# Patient Record
Sex: FEMALE | Race: WHITE | HISPANIC OR LATINO | Employment: FULL TIME | URBAN - METROPOLITAN AREA
[De-identification: names, ages, dates, MRNs, and addresses within clinical notes are randomized per-mention and may not be internally consistent; named-entity substitution may affect disease eponyms.]

---

## 2017-02-23 ENCOUNTER — APPOINTMENT (EMERGENCY)
Dept: RADIOLOGY | Facility: HOSPITAL | Age: 27
End: 2017-02-23
Payer: COMMERCIAL

## 2017-02-23 ENCOUNTER — HOSPITAL ENCOUNTER (EMERGENCY)
Facility: HOSPITAL | Age: 27
Discharge: HOME | End: 2017-02-24
Attending: EMERGENCY MEDICINE | Admitting: EMERGENCY MEDICINE
Payer: COMMERCIAL

## 2017-02-23 VITALS
HEART RATE: 84 BPM | DIASTOLIC BLOOD PRESSURE: 68 MMHG | RESPIRATION RATE: 18 BRPM | OXYGEN SATURATION: 97 % | SYSTOLIC BLOOD PRESSURE: 116 MMHG | TEMPERATURE: 97.9 F | WEIGHT: 126 LBS

## 2017-02-23 DIAGNOSIS — O21.9 NAUSEA/VOMITING IN PREGNANCY: Primary | ICD-10-CM

## 2017-02-23 LAB
ABO GROUP BLD: NORMAL
ALBUMIN SERPL BCP-MCNC: 4.1 G/DL (ref 3.5–5)
ALP SERPL-CCNC: 74 U/L (ref 46–116)
ALT SERPL W P-5'-P-CCNC: 16 U/L (ref 12–78)
ANION GAP SERPL CALCULATED.3IONS-SCNC: 8 MMOL/L (ref 4–13)
AST SERPL W P-5'-P-CCNC: 10 U/L (ref 5–45)
B-HCG SERPL-ACNC: 2337 MIU/ML
BACTERIA UR QL AUTO: NORMAL /HPF
BASOPHILS # BLD AUTO: 0.03 THOUSANDS/ΜL (ref 0–0.1)
BASOPHILS NFR BLD AUTO: 0 % (ref 0–1)
BILIRUB SERPL-MCNC: 0.24 MG/DL (ref 0.2–1)
BILIRUB UR QL STRIP: NEGATIVE
BLD GP AB SCN SERPL QL: NEGATIVE
BUN SERPL-MCNC: 9 MG/DL (ref 5–25)
CALCIUM SERPL-MCNC: 9.1 MG/DL (ref 8.3–10.1)
CHLORIDE SERPL-SCNC: 103 MMOL/L (ref 100–108)
CLARITY UR: CLEAR
CO2 SERPL-SCNC: 27 MMOL/L (ref 21–32)
COLOR UR: YELLOW
COLOR, POC: YELLOW
CREAT SERPL-MCNC: 0.62 MG/DL (ref 0.6–1.3)
EOSINOPHIL # BLD AUTO: 0.05 THOUSAND/ΜL (ref 0–0.61)
EOSINOPHIL NFR BLD AUTO: 1 % (ref 0–6)
ERYTHROCYTE [DISTWIDTH] IN BLOOD BY AUTOMATED COUNT: 12.5 % (ref 11.6–15.1)
GFR SERPL CREATININE-BSD FRML MDRD: >60 ML/MIN/1.73SQ M
GLUCOSE SERPL-MCNC: 88 MG/DL (ref 65–140)
GLUCOSE UR STRIP-MCNC: NEGATIVE MG/DL
HCG UR QL: POSITIVE
HCT VFR BLD AUTO: 37.2 % (ref 34.8–46.1)
HGB BLD-MCNC: 12.4 G/DL (ref 11.5–15.4)
HGB UR QL STRIP.AUTO: NEGATIVE
HYALINE CASTS #/AREA URNS LPF: NORMAL /LPF
KETONES UR STRIP-MCNC: NEGATIVE MG/DL
LEUKOCYTE ESTERASE UR QL STRIP: NEGATIVE
LIPASE SERPL-CCNC: 167 U/L (ref 73–393)
LYMPHOCYTES # BLD AUTO: 2.68 THOUSANDS/ΜL (ref 0.6–4.47)
LYMPHOCYTES NFR BLD AUTO: 28 % (ref 14–44)
MCH RBC QN AUTO: 30.5 PG (ref 26.8–34.3)
MCHC RBC AUTO-ENTMCNC: 33.3 G/DL (ref 31.4–37.4)
MCV RBC AUTO: 92 FL (ref 82–98)
MONOCYTES # BLD AUTO: 0.6 THOUSAND/ΜL (ref 0.17–1.22)
MONOCYTES NFR BLD AUTO: 6 % (ref 4–12)
NEUTROPHILS # BLD AUTO: 6.25 THOUSANDS/ΜL (ref 1.85–7.62)
NEUTS SEG NFR BLD AUTO: 65 % (ref 43–75)
NITRITE UR QL STRIP: NEGATIVE
NON-SQ EPI CELLS URNS QL MICRO: NORMAL /HPF
NRBC BLD AUTO-RTO: 0 /100 WBCS
PH UR STRIP.AUTO: 8.5 [PH] (ref 4.5–8)
PLATELET # BLD AUTO: 285 THOUSANDS/UL (ref 149–390)
PMV BLD AUTO: 11.8 FL (ref 8.9–12.7)
POTASSIUM SERPL-SCNC: 3.6 MMOL/L (ref 3.5–5.3)
PROT SERPL-MCNC: 8.2 G/DL (ref 6.4–8.2)
PROT UR STRIP-MCNC: ABNORMAL MG/DL
RBC # BLD AUTO: 4.06 MILLION/UL (ref 3.81–5.12)
RBC #/AREA URNS AUTO: NORMAL /HPF
RH BLD: POSITIVE
SODIUM SERPL-SCNC: 138 MMOL/L (ref 136–145)
SP GR UR STRIP.AUTO: 1.01 (ref 1–1.03)
UROBILINOGEN UR QL STRIP.AUTO: 1 E.U./DL
WBC # BLD AUTO: 9.62 THOUSAND/UL (ref 4.31–10.16)
WBC #/AREA URNS AUTO: NORMAL /HPF

## 2017-02-23 PROCEDURE — 81002 URINALYSIS NONAUTO W/O SCOPE: CPT

## 2017-02-23 PROCEDURE — 81025 URINE PREGNANCY TEST: CPT

## 2017-02-23 PROCEDURE — 86850 RBC ANTIBODY SCREEN: CPT | Performed by: EMERGENCY MEDICINE

## 2017-02-23 PROCEDURE — 36415 COLL VENOUS BLD VENIPUNCTURE: CPT

## 2017-02-23 PROCEDURE — 86900 BLOOD TYPING SEROLOGIC ABO: CPT | Performed by: EMERGENCY MEDICINE

## 2017-02-23 PROCEDURE — 96361 HYDRATE IV INFUSION ADD-ON: CPT

## 2017-02-23 PROCEDURE — 84702 CHORIONIC GONADOTROPIN TEST: CPT | Performed by: EMERGENCY MEDICINE

## 2017-02-23 PROCEDURE — 76801 OB US < 14 WKS SINGLE FETUS: CPT

## 2017-02-23 PROCEDURE — 85025 COMPLETE CBC W/AUTO DIFF WBC: CPT

## 2017-02-23 PROCEDURE — 83690 ASSAY OF LIPASE: CPT

## 2017-02-23 PROCEDURE — 86901 BLOOD TYPING SEROLOGIC RH(D): CPT | Performed by: EMERGENCY MEDICINE

## 2017-02-23 PROCEDURE — 76705 ECHO EXAM OF ABDOMEN: CPT

## 2017-02-23 PROCEDURE — 80053 COMPREHEN METABOLIC PANEL: CPT

## 2017-02-23 PROCEDURE — 96374 THER/PROPH/DIAG INJ IV PUSH: CPT

## 2017-02-23 PROCEDURE — 81001 URINALYSIS AUTO W/SCOPE: CPT

## 2017-02-23 RX ORDER — DOXYLAMINE SUCCINATE AND PYRIDOXINE HYDROCHLORIDE, DELAYED RELEASE TABLETS 10 MG/10 MG 10; 10 MG/1; MG/1
10 TABLET, DELAYED RELEASE ORAL
Qty: 30 TABLET | Refills: 0 | Status: SHIPPED | OUTPATIENT
Start: 2017-02-23 | End: 2017-05-01

## 2017-02-23 RX ORDER — ONDANSETRON 2 MG/ML
4 INJECTION INTRAMUSCULAR; INTRAVENOUS ONCE AS NEEDED
Status: COMPLETED | OUTPATIENT
Start: 2017-02-23 | End: 2017-02-23

## 2017-02-23 RX ADMIN — ONDANSETRON 4 MG: 2 INJECTION INTRAMUSCULAR; INTRAVENOUS at 21:56

## 2017-02-23 RX ADMIN — SODIUM CHLORIDE 500 ML: 0.9 INJECTION, SOLUTION INTRAVENOUS at 21:54

## 2017-02-24 PROCEDURE — 99284 EMERGENCY DEPT VISIT MOD MDM: CPT

## 2017-03-29 ENCOUNTER — HOSPITAL ENCOUNTER (EMERGENCY)
Facility: HOSPITAL | Age: 27
Discharge: HOME | End: 2017-03-29
Attending: EMERGENCY MEDICINE | Admitting: EMERGENCY MEDICINE
Payer: COMMERCIAL

## 2017-03-29 VITALS
RESPIRATION RATE: 18 BRPM | OXYGEN SATURATION: 98 % | TEMPERATURE: 96.6 F | HEART RATE: 87 BPM | SYSTOLIC BLOOD PRESSURE: 123 MMHG | DIASTOLIC BLOOD PRESSURE: 68 MMHG | WEIGHT: 117 LBS

## 2017-03-29 DIAGNOSIS — R33.9 URINARY RETENTION: Primary | ICD-10-CM

## 2017-03-29 LAB
BACTERIA UR QL AUTO: NORMAL /HPF
BILIRUB UR QL STRIP: NEGATIVE
CLARITY UR: CLEAR
COLOR UR: YELLOW
COLOR, POC: YELLOW
GLUCOSE UR STRIP-MCNC: NEGATIVE MG/DL
HCG UR QL: ABNORMAL
HGB UR QL STRIP.AUTO: ABNORMAL
KETONES UR STRIP-MCNC: NEGATIVE MG/DL
LEUKOCYTE ESTERASE UR QL STRIP: NEGATIVE
NITRITE UR QL STRIP: NEGATIVE
NON-SQ EPI CELLS URNS QL MICRO: NORMAL /HPF
PH UR STRIP.AUTO: 7 [PH] (ref 4.5–8)
PROT UR STRIP-MCNC: NEGATIVE MG/DL
RBC #/AREA URNS AUTO: NORMAL /HPF
SP GR UR STRIP.AUTO: 1.01 (ref 1–1.03)
UROBILINOGEN UR QL STRIP.AUTO: 0.2 E.U./DL
WBC #/AREA URNS AUTO: NORMAL /HPF

## 2017-03-29 PROCEDURE — 81025 URINE PREGNANCY TEST: CPT | Performed by: EMERGENCY MEDICINE

## 2017-03-29 PROCEDURE — 87086 URINE CULTURE/COLONY COUNT: CPT

## 2017-03-29 PROCEDURE — 99283 EMERGENCY DEPT VISIT LOW MDM: CPT

## 2017-03-29 PROCEDURE — 81002 URINALYSIS NONAUTO W/O SCOPE: CPT | Performed by: EMERGENCY MEDICINE

## 2017-03-29 PROCEDURE — 81001 URINALYSIS AUTO W/SCOPE: CPT

## 2017-03-30 ENCOUNTER — ALLSCRIPTS OFFICE VISIT (OUTPATIENT)
Dept: OTHER | Facility: OTHER | Age: 27
End: 2017-03-30

## 2017-03-31 LAB — BACTERIA UR CULT: NORMAL

## 2017-04-13 ENCOUNTER — GENERIC CONVERSION - ENCOUNTER (OUTPATIENT)
Dept: OTHER | Facility: OTHER | Age: 27
End: 2017-04-13

## 2017-04-13 ENCOUNTER — ALLSCRIPTS OFFICE VISIT (OUTPATIENT)
Dept: OTHER | Facility: OTHER | Age: 27
End: 2017-04-13

## 2017-04-13 DIAGNOSIS — Z34.00 ENCOUNTER FOR SUPERVISION OF NORMAL FIRST PREGNANCY: ICD-10-CM

## 2017-04-14 ENCOUNTER — TRANSCRIBE ORDERS (OUTPATIENT)
Dept: ADMINISTRATIVE | Age: 27
End: 2017-04-14

## 2017-04-14 ENCOUNTER — APPOINTMENT (OUTPATIENT)
Dept: LAB | Age: 27
End: 2017-04-14
Payer: COMMERCIAL

## 2017-04-14 DIAGNOSIS — Z34.00 ENCOUNTER FOR SUPERVISION OF NORMAL FIRST PREGNANCY: ICD-10-CM

## 2017-04-14 LAB
BACTERIA UR QL AUTO: ABNORMAL /HPF
BASOPHILS # BLD AUTO: 0.02 THOUSANDS/ΜL (ref 0–0.1)
BASOPHILS NFR BLD AUTO: 0 % (ref 0–1)
BILIRUB UR QL STRIP: NEGATIVE
CLARITY UR: CLEAR
COLOR UR: YELLOW
EOSINOPHIL # BLD AUTO: 0.08 THOUSAND/ΜL (ref 0–0.61)
EOSINOPHIL NFR BLD AUTO: 1 % (ref 0–6)
ERYTHROCYTE [DISTWIDTH] IN BLOOD BY AUTOMATED COUNT: 12.4 % (ref 11.6–15.1)
GLUCOSE UR STRIP-MCNC: NEGATIVE MG/DL
HCT VFR BLD AUTO: 35.1 % (ref 34.8–46.1)
HGB BLD-MCNC: 11.4 G/DL (ref 11.5–15.4)
HGB UR QL STRIP.AUTO: NEGATIVE
HYALINE CASTS #/AREA URNS LPF: ABNORMAL /LPF
KETONES UR STRIP-MCNC: NEGATIVE MG/DL
LEUKOCYTE ESTERASE UR QL STRIP: NEGATIVE
LYMPHOCYTES # BLD AUTO: 2.04 THOUSANDS/ΜL (ref 0.6–4.47)
LYMPHOCYTES NFR BLD AUTO: 22 % (ref 14–44)
MCH RBC QN AUTO: 29.7 PG (ref 26.8–34.3)
MCHC RBC AUTO-ENTMCNC: 32.5 G/DL (ref 31.4–37.4)
MCV RBC AUTO: 91 FL (ref 82–98)
MONOCYTES # BLD AUTO: 0.41 THOUSAND/ΜL (ref 0.17–1.22)
MONOCYTES NFR BLD AUTO: 4 % (ref 4–12)
NEUTROPHILS # BLD AUTO: 6.67 THOUSANDS/ΜL (ref 1.85–7.62)
NEUTS SEG NFR BLD AUTO: 73 % (ref 43–75)
NITRITE UR QL STRIP: NEGATIVE
NON-SQ EPI CELLS URNS QL MICRO: ABNORMAL /HPF
NRBC BLD AUTO-RTO: 0 /100 WBCS
PH UR STRIP.AUTO: 7.5 [PH] (ref 4.5–8)
PLATELET # BLD AUTO: 260 THOUSANDS/UL (ref 149–390)
PMV BLD AUTO: 12.1 FL (ref 8.9–12.7)
PROT UR STRIP-MCNC: ABNORMAL MG/DL
RBC # BLD AUTO: 3.84 MILLION/UL (ref 3.81–5.12)
RBC #/AREA URNS AUTO: ABNORMAL /HPF
SP GR UR STRIP.AUTO: 1.03 (ref 1–1.03)
UROBILINOGEN UR QL STRIP.AUTO: 0.2 E.U./DL
WBC # BLD AUTO: 9.24 THOUSAND/UL (ref 4.31–10.16)
WBC #/AREA URNS AUTO: ABNORMAL /HPF

## 2017-04-14 PROCEDURE — 87086 URINE CULTURE/COLONY COUNT: CPT

## 2017-04-14 PROCEDURE — 81001 URINALYSIS AUTO W/SCOPE: CPT

## 2017-04-14 PROCEDURE — 36415 COLL VENOUS BLD VENIPUNCTURE: CPT

## 2017-04-14 PROCEDURE — 87186 SC STD MICRODIL/AGAR DIL: CPT

## 2017-04-14 PROCEDURE — 80081 OBSTETRIC PANEL INC HIV TSTG: CPT

## 2017-04-14 PROCEDURE — 87081 CULTURE SCREEN ONLY: CPT

## 2017-04-14 PROCEDURE — 87147 CULTURE TYPE IMMUNOLOGIC: CPT

## 2017-04-15 LAB
ABO GROUP BLD: NORMAL
BLD GP AB SCN SERPL QL: NEGATIVE
HBV SURFACE AG SER QL: NORMAL
RH BLD: POSITIVE
RUBV IGG SERPL IA-ACNC: 26.9 IU/ML

## 2017-04-16 LAB
BACTERIA UR CULT: NORMAL
MRSA NOSE QL CULT: NORMAL

## 2017-04-17 LAB
HIV 1+2 AB+HIV1 P24 AG SERPL QL IA: NORMAL
RPR SER QL: NORMAL

## 2017-04-20 ENCOUNTER — ALLSCRIPTS OFFICE VISIT (OUTPATIENT)
Dept: PERINATAL CARE | Facility: CLINIC | Age: 27
End: 2017-04-20
Payer: COMMERCIAL

## 2017-04-20 ENCOUNTER — GENERIC CONVERSION - ENCOUNTER (OUTPATIENT)
Dept: OTHER | Facility: OTHER | Age: 27
End: 2017-04-20

## 2017-04-20 PROCEDURE — 76813 OB US NUCHAL MEAS 1 GEST: CPT | Performed by: OBSTETRICS & GYNECOLOGY

## 2017-04-25 ENCOUNTER — LAB REQUISITION (OUTPATIENT)
Dept: LAB | Facility: HOSPITAL | Age: 27
End: 2017-04-25
Payer: COMMERCIAL

## 2017-04-25 ENCOUNTER — ALLSCRIPTS OFFICE VISIT (OUTPATIENT)
Dept: OTHER | Facility: OTHER | Age: 27
End: 2017-04-25

## 2017-04-25 DIAGNOSIS — Z12.4 ENCOUNTER FOR SCREENING FOR MALIGNANT NEOPLASM OF CERVIX: ICD-10-CM

## 2017-04-25 DIAGNOSIS — Z34.00 ENCOUNTER FOR SUPERVISION OF NORMAL FIRST PREGNANCY: ICD-10-CM

## 2017-04-25 LAB
BILIRUB UR QL STRIP: NEGATIVE
CLARITY UR: NORMAL
COLOR UR: NORMAL
GLUCOSE (HISTORICAL): NEGATIVE
HGB UR QL STRIP.AUTO: NEGATIVE
KETONES UR STRIP-MCNC: NEGATIVE MG/DL
LEUKOCYTE ESTERASE UR QL STRIP: NEGATIVE
NITRITE UR QL STRIP: NEGATIVE
PH UR STRIP.AUTO: 5 [PH]
PROT UR STRIP-MCNC: NORMAL MG/DL
SP GR UR STRIP.AUTO: 1.02
UROBILINOGEN UR QL STRIP.AUTO: 0.2

## 2017-04-25 PROCEDURE — G0145 SCR C/V CYTO,THINLAYER,RESCR: HCPCS | Performed by: NURSE PRACTITIONER

## 2017-04-26 ENCOUNTER — LAB CONVERSION - ENCOUNTER (OUTPATIENT)
Dept: OTHER | Facility: OTHER | Age: 27
End: 2017-04-26

## 2017-04-26 LAB
AGE RISK DOWN SYNDROME (HISTORICAL): NORMAL
CALC'D GESTATIONAL AGE (HISTORICAL): 13.1
COLLECTION DATE (HISTORICAL): NORMAL
CROWN RUMP LENGTH (HISTORICAL): 70 MM
CROWN RUMP LENGTH (HISTORICAL): NORMAL MM
DATE OF BIRTH (HISTORICAL): NORMAL
DONOR AGE; EGG RETRIEVAL (HISTORICAL): NORMAL
DONOR EGG (HISTORICAL): NO
EDD DETERMINED BY (HISTORICAL): NORMAL
ESTIMATED DELIVERY DATE (EDD) (HISTORICAL): NORMAL
HCG MOM (HISTORICAL): 0.97
HCG QUANTITATIVE (HISTORICAL): 77.8 IU/ML
HX OF NEURAL TUBE DEFECTS (HISTORICAL): NO
IF TWINS (HISTORICAL): NORMAL
INSULIN DEP. DIABETIC (HISTORICAL): NO
INTERPRETATION (HISTORICAL): NORMAL
MATERNAL WEIGHT (HISTORICAL): 123 LBS
MSS DOWN SYNDROME RISK (HISTORICAL): NORMAL
MSS3 TRISOMY 18 RISK (HISTORICAL): NORMAL
NASAL BONE (HISTORICAL): NORMAL
NASAL BONE (HISTORICAL): PRESENT
NT MOM (HISTORICAL): 1.22
NTQR LOCATION ID (HISTORICAL): NORMAL
NTQR READING PHYS ID (HISTORICAL): NORMAL
NUCHAL TRANSLUCENCY (HISTORICAL): 1.9 MM
NUCHAL TRANSLUCENCY (HISTORICAL): NORMAL MM
NUMBER OF FETUSES (HISTORICAL): 1
PAPP-A (HISTORICAL): 0.49
PAPP-A (HISTORICAL): 714.8 NG/ML
PREV PREGNANCY DOWN SYND (HISTORICAL): NO
RACE/ETHNIC ORIGIN (HISTORICAL): NORMAL
REFERRING PHYSICIAN (HISTORICAL): NORMAL
REFERRING PHYSICIAN NPI (HISTORICAL): NORMAL
REFERRING PHYSICIAN PHONE (HISTORICAL): NORMAL
REPEAT SPECIMEN (HISTORICAL): NO
ULTRASONOGRAPHER ID (HISTORICAL): NORMAL
ULTRASOUND DATE (HISTORICAL): NORMAL

## 2017-04-26 PROCEDURE — 87491 CHLMYD TRACH DNA AMP PROBE: CPT | Performed by: NURSE PRACTITIONER

## 2017-04-26 PROCEDURE — 87591 N.GONORRHOEAE DNA AMP PROB: CPT | Performed by: NURSE PRACTITIONER

## 2017-04-28 ENCOUNTER — GENERIC CONVERSION - ENCOUNTER (OUTPATIENT)
Dept: OTHER | Facility: OTHER | Age: 27
End: 2017-04-28

## 2017-04-28 LAB
CHLAMYDIA DNA CVX QL NAA+PROBE: NORMAL
N GONORRHOEA DNA GENITAL QL NAA+PROBE: NORMAL

## 2017-05-01 ENCOUNTER — HOSPITAL ENCOUNTER (EMERGENCY)
Facility: HOSPITAL | Age: 27
Discharge: HOME | End: 2017-05-01
Attending: EMERGENCY MEDICINE | Admitting: EMERGENCY MEDICINE
Payer: COMMERCIAL

## 2017-05-01 ENCOUNTER — GENERIC CONVERSION - ENCOUNTER (OUTPATIENT)
Dept: OTHER | Facility: OTHER | Age: 27
End: 2017-05-01

## 2017-05-01 VITALS
DIASTOLIC BLOOD PRESSURE: 55 MMHG | HEIGHT: 61 IN | OXYGEN SATURATION: 100 % | TEMPERATURE: 97.5 F | RESPIRATION RATE: 18 BRPM | HEART RATE: 102 BPM | SYSTOLIC BLOOD PRESSURE: 102 MMHG

## 2017-05-01 DIAGNOSIS — R10.9 ABDOMINAL PAIN DURING PREGNANCY IN SECOND TRIMESTER: Primary | ICD-10-CM

## 2017-05-01 DIAGNOSIS — O26.892 ABDOMINAL PAIN DURING PREGNANCY IN SECOND TRIMESTER: Primary | ICD-10-CM

## 2017-05-01 LAB
ALBUMIN SERPL BCP-MCNC: 3.3 G/DL (ref 3.5–5)
ALP SERPL-CCNC: 53 U/L (ref 46–116)
ALT SERPL W P-5'-P-CCNC: 30 U/L (ref 12–78)
ANION GAP SERPL CALCULATED.3IONS-SCNC: 9 MMOL/L (ref 4–13)
AST SERPL W P-5'-P-CCNC: 19 U/L (ref 5–45)
ATRIAL RATE: 112 BPM
ATRIAL RATE: 96 BPM
BACTERIA UR QL AUTO: ABNORMAL /HPF
BASOPHILS # BLD AUTO: 0.02 THOUSANDS/ΜL (ref 0–0.1)
BASOPHILS NFR BLD AUTO: 0 % (ref 0–1)
BILIRUB SERPL-MCNC: 0.28 MG/DL (ref 0.2–1)
BILIRUB UR QL STRIP: ABNORMAL
BUN SERPL-MCNC: 4 MG/DL (ref 5–25)
CALCIUM SERPL-MCNC: 9.2 MG/DL (ref 8.3–10.1)
CHLORIDE SERPL-SCNC: 104 MMOL/L (ref 100–108)
CLARITY UR: ABNORMAL
CO2 SERPL-SCNC: 25 MMOL/L (ref 21–32)
COLOR UR: YELLOW
COLOR, POC: YELLOW
CREAT SERPL-MCNC: 0.4 MG/DL (ref 0.6–1.3)
EOSINOPHIL # BLD AUTO: 0.04 THOUSAND/ΜL (ref 0–0.61)
EOSINOPHIL NFR BLD AUTO: 1 % (ref 0–6)
ERYTHROCYTE [DISTWIDTH] IN BLOOD BY AUTOMATED COUNT: 12.3 % (ref 11.6–15.1)
GFR SERPL CREATININE-BSD FRML MDRD: >60 ML/MIN/1.73SQ M
GLUCOSE SERPL-MCNC: 79 MG/DL (ref 65–140)
GLUCOSE UR STRIP-MCNC: NEGATIVE MG/DL
HCT VFR BLD AUTO: 32.1 % (ref 34.8–46.1)
HGB BLD-MCNC: 10.6 G/DL (ref 11.5–15.4)
HGB UR QL STRIP.AUTO: NEGATIVE
HYALINE CASTS #/AREA URNS LPF: ABNORMAL /LPF
KETONES UR STRIP-MCNC: ABNORMAL MG/DL
LEUKOCYTE ESTERASE UR QL STRIP: NEGATIVE
LIPASE SERPL-CCNC: 108 U/L (ref 73–393)
LYMPHOCYTES # BLD AUTO: 2.35 THOUSANDS/ΜL (ref 0.6–4.47)
LYMPHOCYTES NFR BLD AUTO: 30 % (ref 14–44)
MCH RBC QN AUTO: 29.8 PG (ref 26.8–34.3)
MCHC RBC AUTO-ENTMCNC: 33 G/DL (ref 31.4–37.4)
MCV RBC AUTO: 90 FL (ref 82–98)
MONOCYTES # BLD AUTO: 0.35 THOUSAND/ΜL (ref 0.17–1.22)
MONOCYTES NFR BLD AUTO: 5 % (ref 4–12)
NEUTROPHILS # BLD AUTO: 5.04 THOUSANDS/ΜL (ref 1.85–7.62)
NEUTS SEG NFR BLD AUTO: 64 % (ref 43–75)
NITRITE UR QL STRIP: NEGATIVE
NON-SQ EPI CELLS URNS QL MICRO: ABNORMAL /HPF
NRBC BLD AUTO-RTO: 0 /100 WBCS
P AXIS: 82 DEGREES
P AXIS: 93 DEGREES
PH UR STRIP.AUTO: 5.5 [PH] (ref 4.5–8)
PLATELET # BLD AUTO: 234 THOUSANDS/UL (ref 149–390)
PMV BLD AUTO: 11.1 FL (ref 8.9–12.7)
POTASSIUM SERPL-SCNC: 3.4 MMOL/L (ref 3.5–5.3)
PR INTERVAL: 118 MS
PR INTERVAL: 122 MS
PROT SERPL-MCNC: 7.5 G/DL (ref 6.4–8.2)
PROT UR STRIP-MCNC: ABNORMAL MG/DL
QRS AXIS: 74 DEGREES
QRS AXIS: 80 DEGREES
QRSD INTERVAL: 82 MS
QRSD INTERVAL: 92 MS
QT INTERVAL: 296 MS
QT INTERVAL: 372 MS
QTC INTERVAL: 404 MS
QTC INTERVAL: 469 MS
RBC # BLD AUTO: 3.56 MILLION/UL (ref 3.81–5.12)
RBC #/AREA URNS AUTO: ABNORMAL /HPF
SODIUM SERPL-SCNC: 138 MMOL/L (ref 136–145)
SP GR UR STRIP.AUTO: >=1.03 (ref 1–1.03)
SPECIMEN SOURCE: NORMAL
T WAVE AXIS: 69 DEGREES
T WAVE AXIS: 79 DEGREES
TROPONIN I BLD-MCNC: 0 NG/ML (ref 0–0.08)
UROBILINOGEN UR QL STRIP.AUTO: 0.2 E.U./DL
VENTRICULAR RATE: 112 BPM
VENTRICULAR RATE: 96 BPM
WBC # BLD AUTO: 7.82 THOUSAND/UL (ref 4.31–10.16)
WBC #/AREA URNS AUTO: ABNORMAL /HPF

## 2017-05-01 PROCEDURE — 96361 HYDRATE IV INFUSION ADD-ON: CPT

## 2017-05-01 PROCEDURE — 93005 ELECTROCARDIOGRAM TRACING: CPT

## 2017-05-01 PROCEDURE — 80053 COMPREHEN METABOLIC PANEL: CPT | Performed by: EMERGENCY MEDICINE

## 2017-05-01 PROCEDURE — 36415 COLL VENOUS BLD VENIPUNCTURE: CPT | Performed by: EMERGENCY MEDICINE

## 2017-05-01 PROCEDURE — 85025 COMPLETE CBC W/AUTO DIFF WBC: CPT | Performed by: EMERGENCY MEDICINE

## 2017-05-01 PROCEDURE — 99284 EMERGENCY DEPT VISIT MOD MDM: CPT

## 2017-05-01 PROCEDURE — 93005 ELECTROCARDIOGRAM TRACING: CPT | Performed by: EMERGENCY MEDICINE

## 2017-05-01 PROCEDURE — 84484 ASSAY OF TROPONIN QUANT: CPT

## 2017-05-01 PROCEDURE — 83690 ASSAY OF LIPASE: CPT | Performed by: EMERGENCY MEDICINE

## 2017-05-01 PROCEDURE — 96360 HYDRATION IV INFUSION INIT: CPT

## 2017-05-01 PROCEDURE — 81002 URINALYSIS NONAUTO W/O SCOPE: CPT | Performed by: EMERGENCY MEDICINE

## 2017-05-01 PROCEDURE — 81001 URINALYSIS AUTO W/SCOPE: CPT

## 2017-05-01 RX ORDER — LEVOTHYROXINE SODIUM 0.1 MG/1
100 TABLET ORAL DAILY
COMMUNITY
End: 2017-05-01

## 2017-05-01 RX ORDER — ATORVASTATIN CALCIUM 10 MG/1
10 TABLET, FILM COATED ORAL DAILY
COMMUNITY
End: 2017-05-01

## 2017-05-01 RX ORDER — OMEGA-3-ACID ETHYL ESTERS 1 G/1
2 CAPSULE, LIQUID FILLED ORAL DAILY
COMMUNITY
End: 2017-05-01

## 2017-05-01 RX ORDER — RAMIPRIL 10 MG/1
10 CAPSULE ORAL DAILY
COMMUNITY
End: 2017-05-01

## 2017-05-01 RX ORDER — BUTALBITAL, ASPIRIN, AND CAFFEINE 50; 325; 40 MG/1; MG/1; MG/1
1 CAPSULE ORAL EVERY 4 HOURS PRN
COMMUNITY
End: 2018-01-30 | Stop reason: SDUPTHER

## 2017-05-01 RX ORDER — CARVEDILOL 25 MG/1
25 TABLET ORAL 2 TIMES DAILY WITH MEALS
COMMUNITY
End: 2017-05-01

## 2017-05-01 RX ORDER — CLOPIDOGREL BISULFATE 75 MG/1
75 TABLET ORAL DAILY
COMMUNITY
End: 2017-05-01

## 2017-05-01 RX ORDER — CYCLOBENZAPRINE HCL 5 MG
5 TABLET ORAL 2 TIMES DAILY PRN
COMMUNITY
End: 2017-05-01

## 2017-05-01 RX ORDER — CHLORAL HYDRATE 500 MG
1000 CAPSULE ORAL DAILY
COMMUNITY
End: 2017-05-01

## 2017-05-01 RX ORDER — FUROSEMIDE 20 MG/1
20 TABLET ORAL DAILY
COMMUNITY
End: 2017-05-01

## 2017-05-01 RX ORDER — OMEPRAZOLE 40 MG/1
40 CAPSULE, DELAYED RELEASE ORAL DAILY
COMMUNITY
End: 2017-05-01

## 2017-05-01 RX ORDER — ACETAMINOPHEN 325 MG/1
TABLET ORAL
Status: DISCONTINUED
Start: 2017-05-01 | End: 2017-05-01 | Stop reason: HOSPADM

## 2017-05-01 RX ORDER — LORAZEPAM 0.5 MG/1
0.5 TABLET ORAL 2 TIMES DAILY PRN
COMMUNITY
End: 2017-05-01

## 2017-05-01 RX ORDER — ACETAMINOPHEN 325 MG/1
650 TABLET ORAL ONCE
Status: COMPLETED | OUTPATIENT
Start: 2017-05-01 | End: 2017-05-01

## 2017-05-01 RX ORDER — SERTRALINE HYDROCHLORIDE 25 MG/1
25 TABLET, FILM COATED ORAL
COMMUNITY
End: 2017-05-01

## 2017-05-01 RX ADMIN — SODIUM CHLORIDE 1000 ML: 0.9 INJECTION, SOLUTION INTRAVENOUS at 16:31

## 2017-05-01 RX ADMIN — ACETAMINOPHEN 650 MG: 325 TABLET, FILM COATED ORAL at 16:31

## 2017-05-02 ENCOUNTER — HOSPITAL ENCOUNTER (OUTPATIENT)
Dept: NON INVASIVE DIAGNOSTICS | Facility: HOSPITAL | Age: 27
Discharge: HOME | End: 2017-05-02
Payer: COMMERCIAL

## 2017-05-02 DIAGNOSIS — O26.892 ABDOMINAL PAIN DURING PREGNANCY IN SECOND TRIMESTER: ICD-10-CM

## 2017-05-02 DIAGNOSIS — R10.9 ABDOMINAL PAIN DURING PREGNANCY IN SECOND TRIMESTER: ICD-10-CM

## 2017-05-02 PROCEDURE — 93225 XTRNL ECG REC<48 HRS REC: CPT

## 2017-05-02 PROCEDURE — 93226 XTRNL ECG REC<48 HR SCAN A/R: CPT

## 2017-05-03 ENCOUNTER — GENERIC CONVERSION - ENCOUNTER (OUTPATIENT)
Dept: OTHER | Facility: OTHER | Age: 27
End: 2017-05-03

## 2017-05-03 LAB
LAB AP GYN PRIMARY INTERPRETATION: NORMAL
LAB AP LMP: NORMAL
Lab: NORMAL
PATH INTERP SPEC-IMP: NORMAL

## 2017-05-24 ENCOUNTER — ALLSCRIPTS OFFICE VISIT (OUTPATIENT)
Dept: OTHER | Facility: OTHER | Age: 27
End: 2017-05-24

## 2017-05-24 ENCOUNTER — LAB REQUISITION (OUTPATIENT)
Dept: LAB | Facility: HOSPITAL | Age: 27
End: 2017-05-24
Payer: COMMERCIAL

## 2017-05-24 DIAGNOSIS — O34.42 MATERNAL CARE FOR OTHER ABNORMALITIES OF CERVIX, SECOND TRIMESTER: ICD-10-CM

## 2017-05-24 DIAGNOSIS — R87.612 LOW GRADE SQUAMOUS INTRAEPITHELIAL LESION ON CYTOLOGIC SMEAR OF CERVIX (LGSIL): ICD-10-CM

## 2017-05-24 PROCEDURE — 88305 TISSUE EXAM BY PATHOLOGIST: CPT | Performed by: OBSTETRICS & GYNECOLOGY

## 2017-06-01 ENCOUNTER — OFFICE VISIT (OUTPATIENT)
Dept: URGENT CARE | Age: 27
End: 2017-06-01
Payer: COMMERCIAL

## 2017-06-01 PROCEDURE — G0382 LEV 3 HOSP TYPE B ED VISIT: HCPCS | Performed by: FAMILY MEDICINE

## 2017-06-01 PROCEDURE — 99283 EMERGENCY DEPT VISIT LOW MDM: CPT | Performed by: FAMILY MEDICINE

## 2017-06-02 ENCOUNTER — GENERIC CONVERSION - ENCOUNTER (OUTPATIENT)
Dept: OTHER | Facility: OTHER | Age: 27
End: 2017-06-02

## 2017-06-07 ENCOUNTER — LAB CONVERSION - ENCOUNTER (OUTPATIENT)
Dept: OTHER | Facility: OTHER | Age: 27
End: 2017-06-07

## 2017-06-07 ENCOUNTER — GENERIC CONVERSION - ENCOUNTER (OUTPATIENT)
Dept: OTHER | Facility: OTHER | Age: 27
End: 2017-06-07

## 2017-06-07 LAB
AFP (HISTORICAL): 0.65
AFP (HISTORICAL): 35 NG/ML
AGE RISK DOWN SYNDROME (HISTORICAL): NORMAL
CALC'D GESTATIONAL AGE (HISTORICAL): 19
COLLECTION DATE (HISTORICAL): NORMAL
CROWN RUMP LENGTH (HISTORICAL): 70 MM
DATE OF BIRTH (HISTORICAL): NORMAL
ESTIMATED DELIVERY DATE (EDD) (HISTORICAL): NORMAL
ESTRADIOL, FREE (HISTORICAL): 1.71 NG/ML
ESTRIOL MOM (HISTORICAL): 0.92
HCG MOM (HISTORICAL): 0.83
HCG QUANTITATIVE (HISTORICAL): 20 IU/ML
HX OF NEURAL TUBE DEFECTS (HISTORICAL): NO
INHIBIN A (HISTORICAL): 121 PG/ML
INHIBIN A MOM (HISTORICAL): 0.7
INSULIN DEP. DIABETIC (HISTORICAL): NO
INTERPRETATION (HISTORICAL): NORMAL
MATERNAL WEIGHT (HISTORICAL): 126 LBS
MSAFP RISK OPEN NTD (HISTORICAL): NORMAL
MSS DOWN SYNDROME RISK (HISTORICAL): NORMAL
MSS3 TRISOMY 18 RISK (HISTORICAL): NORMAL
NASAL BONE (HISTORICAL): NORMAL
NASAL BONE (HISTORICAL): PRESENT
NT MOM (HISTORICAL): 1.22
NUCHAL TRANSLUCENCY (HISTORICAL): 1.9 MM
NUMBER OF FETUSES (HISTORICAL): 1
PAPP-A (HISTORICAL): 0.51
PAPP-A (HISTORICAL): 714.8 NG/ML
RACE/ETHNIC ORIGIN (HISTORICAL): NORMAL
REFERRING PHYSICIAN (HISTORICAL): NORMAL
REFERRING PHYSICIAN NPI (HISTORICAL): NORMAL
REFERRING PHYSICIAN PHONE (HISTORICAL): NORMAL
REPEAT SPECIMEN (HISTORICAL): NO
SPECIMEN: (HISTORICAL): NORMAL
ULTRASOUND DATE (HISTORICAL): NORMAL

## 2017-06-09 ENCOUNTER — GENERIC CONVERSION - ENCOUNTER (OUTPATIENT)
Dept: OTHER | Facility: OTHER | Age: 27
End: 2017-06-09

## 2017-06-16 ENCOUNTER — ALLSCRIPTS OFFICE VISIT (OUTPATIENT)
Dept: PERINATAL CARE | Facility: CLINIC | Age: 27
End: 2017-06-16
Payer: COMMERCIAL

## 2017-06-16 ENCOUNTER — GENERIC CONVERSION - ENCOUNTER (OUTPATIENT)
Dept: OTHER | Facility: OTHER | Age: 27
End: 2017-06-16

## 2017-06-16 PROCEDURE — 76817 TRANSVAGINAL US OBSTETRIC: CPT | Performed by: OBSTETRICS & GYNECOLOGY

## 2017-06-16 PROCEDURE — 76805 OB US >/= 14 WKS SNGL FETUS: CPT | Performed by: OBSTETRICS & GYNECOLOGY

## 2017-06-23 ENCOUNTER — GENERIC CONVERSION - ENCOUNTER (OUTPATIENT)
Dept: OTHER | Facility: OTHER | Age: 27
End: 2017-06-23

## 2017-06-27 ENCOUNTER — GENERIC CONVERSION - ENCOUNTER (OUTPATIENT)
Dept: OTHER | Facility: OTHER | Age: 27
End: 2017-06-27

## 2017-07-21 ENCOUNTER — GENERIC CONVERSION - ENCOUNTER (OUTPATIENT)
Dept: OTHER | Facility: OTHER | Age: 27
End: 2017-07-21

## 2017-07-24 DIAGNOSIS — M54.50 LOW BACK PAIN: ICD-10-CM

## 2017-07-24 DIAGNOSIS — Z34.90 ENCOUNTER FOR SUPERVISION OF NORMAL PREGNANCY: ICD-10-CM

## 2017-07-24 DIAGNOSIS — Z33.1 PREGNANT STATE, INCIDENTAL: ICD-10-CM

## 2017-07-24 DIAGNOSIS — R10.9 ABDOMINAL PAIN: ICD-10-CM

## 2017-07-31 ENCOUNTER — HOSPITAL ENCOUNTER (OUTPATIENT)
Facility: HOSPITAL | Age: 27
Discharge: HOME | End: 2017-07-31
Attending: OBSTETRICS & GYNECOLOGY | Admitting: OBSTETRICS & GYNECOLOGY
Payer: COMMERCIAL

## 2017-07-31 VITALS
RESPIRATION RATE: 18 BRPM | TEMPERATURE: 98 F | HEART RATE: 114 BPM | DIASTOLIC BLOOD PRESSURE: 67 MMHG | WEIGHT: 137 LBS | HEIGHT: 61 IN | BODY MASS INDEX: 25.86 KG/M2 | SYSTOLIC BLOOD PRESSURE: 121 MMHG

## 2017-07-31 DIAGNOSIS — Z3A.27 27 WEEKS GESTATION OF PREGNANCY: ICD-10-CM

## 2017-07-31 DIAGNOSIS — O47.9: ICD-10-CM

## 2017-07-31 PROBLEM — O09.899 RUBELLA NON-IMMUNE STATUS, ANTEPARTUM: Status: ACTIVE | Noted: 2017-07-31

## 2017-07-31 PROBLEM — Z28.39 RUBELLA NON-IMMUNE STATUS, ANTEPARTUM: Status: ACTIVE | Noted: 2017-07-31

## 2017-07-31 LAB — GLUCOSE SERPL-MCNC: 107 MG/DL (ref 65–140)

## 2017-07-31 PROCEDURE — 82948 REAGENT STRIP/BLOOD GLUCOSE: CPT

## 2017-07-31 PROCEDURE — 76817 TRANSVAGINAL US OBSTETRIC: CPT | Performed by: OBSTETRICS & GYNECOLOGY

## 2017-07-31 PROCEDURE — 99204 OFFICE O/P NEW MOD 45 MIN: CPT

## 2017-07-31 RX ORDER — ONDANSETRON 4 MG/1
4 TABLET, FILM COATED ORAL EVERY 8 HOURS PRN
COMMUNITY
End: 2018-01-26 | Stop reason: ALTCHOICE

## 2017-07-31 RX ORDER — ACETAMINOPHEN 325 MG/1
650 TABLET ORAL EVERY 6 HOURS PRN
Status: DISCONTINUED | OUTPATIENT
Start: 2017-07-31 | End: 2017-07-31 | Stop reason: HOSPADM

## 2017-07-31 RX ADMIN — ACETAMINOPHEN 650 MG: 325 TABLET, FILM COATED ORAL at 14:46

## 2017-08-02 ENCOUNTER — LAB CONVERSION - ENCOUNTER (OUTPATIENT)
Dept: OTHER | Facility: OTHER | Age: 27
End: 2017-08-02

## 2017-08-02 LAB
BASOPHILS # BLD AUTO: 0.3 %
BASOPHILS # BLD AUTO: 29 CELLS/UL (ref 0–200)
DEPRECATED RDW RBC AUTO: 11.8 % (ref 11–15)
EOSINOPHIL # BLD AUTO: 0.6 %
EOSINOPHIL # BLD AUTO: 58 CELLS/UL (ref 15–500)
GLUCOSE 1 HR 50 GM GLUC CHALLENGE-PREG PTS (HISTORICAL): 90 MG/DL
HCT VFR BLD AUTO: 33 % (ref 35–45)
HGB BLD-MCNC: 10.5 G/DL (ref 11.7–15.5)
LYMPHOCYTES # BLD AUTO: 2122 CELLS/UL (ref 850–3900)
LYMPHOCYTES # BLD AUTO: 22.1 %
MCH RBC QN AUTO: 28.2 PG (ref 27–33)
MCHC RBC AUTO-ENTMCNC: 31.8 G/DL (ref 32–36)
MCV RBC AUTO: 88.5 FL (ref 80–100)
MONOCYTES # BLD AUTO: 643 CELLS/UL (ref 200–950)
MONOCYTES (HISTORICAL): 6.7 %
NEUTROPHILS # BLD AUTO: 6749 CELLS/UL (ref 1500–7800)
NEUTROPHILS # BLD AUTO: 70.3 %
PLATELET # BLD AUTO: 241 THOUSAND/UL (ref 140–400)
PMV BLD AUTO: 12.2 FL (ref 7.5–12.5)
RBC # BLD AUTO: 3.73 MILLION/UL (ref 3.8–5.1)
RPR SCREEN (HISTORICAL): NORMAL
WBC # BLD AUTO: 9.6 THOUSAND/UL (ref 3.8–10.8)

## 2017-08-03 ENCOUNTER — GENERIC CONVERSION - ENCOUNTER (OUTPATIENT)
Dept: OTHER | Facility: OTHER | Age: 27
End: 2017-08-03

## 2017-08-09 ENCOUNTER — ALLSCRIPTS OFFICE VISIT (OUTPATIENT)
Dept: OTHER | Facility: OTHER | Age: 27
End: 2017-08-09

## 2017-08-22 ENCOUNTER — HOSPITAL ENCOUNTER (OUTPATIENT)
Facility: HOSPITAL | Age: 27
Discharge: HOME | End: 2017-08-22
Attending: OBSTETRICS & GYNECOLOGY | Admitting: OBSTETRICS & GYNECOLOGY
Payer: COMMERCIAL

## 2017-08-22 VITALS
RESPIRATION RATE: 18 BRPM | DIASTOLIC BLOOD PRESSURE: 72 MMHG | TEMPERATURE: 98.3 F | SYSTOLIC BLOOD PRESSURE: 120 MMHG | HEART RATE: 111 BPM

## 2017-08-22 DIAGNOSIS — R10.9 ABDOMINAL PAIN IN PREGNANCY, THIRD TRIMESTER: ICD-10-CM

## 2017-08-22 DIAGNOSIS — Z3A.30 30 WEEKS GESTATION OF PREGNANCY: ICD-10-CM

## 2017-08-22 DIAGNOSIS — O26.893 ABDOMINAL PAIN IN PREGNANCY, THIRD TRIMESTER: ICD-10-CM

## 2017-08-22 LAB
ALBUMIN SERPL BCP-MCNC: 3 G/DL (ref 3.5–5)
ALP SERPL-CCNC: 101 U/L (ref 46–116)
ALT SERPL W P-5'-P-CCNC: 11 U/L (ref 12–78)
ANION GAP SERPL CALCULATED.3IONS-SCNC: 9 MMOL/L (ref 4–13)
AST SERPL W P-5'-P-CCNC: 13 U/L (ref 5–45)
BACTERIA UR QL AUTO: ABNORMAL /HPF
BASOPHILS # BLD AUTO: 0.01 THOUSANDS/ΜL (ref 0–0.1)
BASOPHILS NFR BLD AUTO: 0 % (ref 0–1)
BILIRUB SERPL-MCNC: 0.39 MG/DL (ref 0.2–1)
BILIRUB UR QL STRIP: ABNORMAL
BUN SERPL-MCNC: 4 MG/DL (ref 5–25)
CALCIUM SERPL-MCNC: 8.8 MG/DL (ref 8.3–10.1)
CHLORIDE SERPL-SCNC: 103 MMOL/L (ref 100–108)
CLARITY UR: ABNORMAL
CO2 SERPL-SCNC: 22 MMOL/L (ref 21–32)
COLOR UR: YELLOW
CREAT SERPL-MCNC: 0.38 MG/DL (ref 0.6–1.3)
EOSINOPHIL # BLD AUTO: 0.02 THOUSAND/ΜL (ref 0–0.61)
EOSINOPHIL NFR BLD AUTO: 0 % (ref 0–6)
ERYTHROCYTE [DISTWIDTH] IN BLOOD BY AUTOMATED COUNT: 13.1 % (ref 11.6–15.1)
GFR SERPL CREATININE-BSD FRML MDRD: 146 ML/MIN/1.73SQ M
GLUCOSE SERPL-MCNC: 81 MG/DL (ref 65–140)
GLUCOSE UR STRIP-MCNC: NEGATIVE MG/DL
HCT VFR BLD AUTO: 33.2 % (ref 34.8–46.1)
HGB BLD-MCNC: 10.7 G/DL (ref 11.5–15.4)
HGB UR QL STRIP.AUTO: NEGATIVE
HYALINE CASTS #/AREA URNS LPF: ABNORMAL /LPF
KETONES UR STRIP-MCNC: ABNORMAL MG/DL
LEUKOCYTE ESTERASE UR QL STRIP: NEGATIVE
LYMPHOCYTES # BLD AUTO: 2.07 THOUSANDS/ΜL (ref 0.6–4.47)
LYMPHOCYTES NFR BLD AUTO: 19 % (ref 14–44)
MCH RBC QN AUTO: 28.5 PG (ref 26.8–34.3)
MCHC RBC AUTO-ENTMCNC: 32.2 G/DL (ref 31.4–37.4)
MCV RBC AUTO: 88 FL (ref 82–98)
MONOCYTES # BLD AUTO: 0.53 THOUSAND/ΜL (ref 0.17–1.22)
MONOCYTES NFR BLD AUTO: 5 % (ref 4–12)
NEUTROPHILS # BLD AUTO: 8.26 THOUSANDS/ΜL (ref 1.85–7.62)
NEUTS SEG NFR BLD AUTO: 76 % (ref 43–75)
NITRITE UR QL STRIP: NEGATIVE
NON-SQ EPI CELLS URNS QL MICRO: ABNORMAL /HPF
NRBC BLD AUTO-RTO: 0 /100 WBCS
PH UR STRIP.AUTO: 6 [PH] (ref 4.5–8)
PLATELET # BLD AUTO: 244 THOUSANDS/UL (ref 149–390)
PMV BLD AUTO: 11.9 FL (ref 8.9–12.7)
POTASSIUM SERPL-SCNC: 3.4 MMOL/L (ref 3.5–5.3)
PROT SERPL-MCNC: 7.7 G/DL (ref 6.4–8.2)
PROT UR STRIP-MCNC: NEGATIVE MG/DL
RBC # BLD AUTO: 3.76 MILLION/UL (ref 3.81–5.12)
RBC #/AREA URNS AUTO: ABNORMAL /HPF
SODIUM SERPL-SCNC: 134 MMOL/L (ref 136–145)
SP GR UR STRIP.AUTO: 1.02 (ref 1–1.03)
UROBILINOGEN UR QL STRIP.AUTO: 0.2 E.U./DL
WBC # BLD AUTO: 10.91 THOUSAND/UL (ref 4.31–10.16)
WBC #/AREA URNS AUTO: ABNORMAL /HPF

## 2017-08-22 PROCEDURE — 99214 OFFICE O/P EST MOD 30 MIN: CPT

## 2017-08-22 PROCEDURE — 80053 COMPREHEN METABOLIC PANEL: CPT | Performed by: OBSTETRICS & GYNECOLOGY

## 2017-08-22 PROCEDURE — 81001 URINALYSIS AUTO W/SCOPE: CPT | Performed by: OBSTETRICS & GYNECOLOGY

## 2017-08-22 PROCEDURE — 85025 COMPLETE CBC W/AUTO DIFF WBC: CPT | Performed by: OBSTETRICS & GYNECOLOGY

## 2017-08-22 PROCEDURE — 76817 TRANSVAGINAL US OBSTETRIC: CPT | Performed by: OBSTETRICS & GYNECOLOGY

## 2017-08-22 RX ORDER — OXYCODONE HYDROCHLORIDE AND ACETAMINOPHEN 5; 325 MG/1; MG/1
1 TABLET ORAL ONCE
Status: COMPLETED | OUTPATIENT
Start: 2017-08-22 | End: 2017-08-22

## 2017-08-22 RX ADMIN — OXYCODONE HYDROCHLORIDE AND ACETAMINOPHEN 1 TABLET: 5; 325 TABLET ORAL at 15:50

## 2017-08-22 RX ADMIN — SODIUM CHLORIDE, SODIUM LACTATE, POTASSIUM CHLORIDE, AND CALCIUM CHLORIDE 1000 ML: .6; .31; .03; .02 INJECTION, SOLUTION INTRAVENOUS at 15:37

## 2017-08-24 ENCOUNTER — ALLSCRIPTS OFFICE VISIT (OUTPATIENT)
Dept: OTHER | Facility: OTHER | Age: 27
End: 2017-08-24

## 2017-08-24 ENCOUNTER — HOSPITAL ENCOUNTER (OUTPATIENT)
Facility: HOSPITAL | Age: 27
Discharge: HOME | End: 2017-08-24
Attending: OBSTETRICS & GYNECOLOGY | Admitting: OBSTETRICS & GYNECOLOGY
Payer: COMMERCIAL

## 2017-08-24 VITALS
WEIGHT: 138 LBS | SYSTOLIC BLOOD PRESSURE: 128 MMHG | HEART RATE: 107 BPM | BODY MASS INDEX: 26.06 KG/M2 | RESPIRATION RATE: 18 BRPM | TEMPERATURE: 98.8 F | HEIGHT: 61 IN | DIASTOLIC BLOOD PRESSURE: 57 MMHG

## 2017-08-24 DIAGNOSIS — Z3A.30 30 WEEKS GESTATION OF PREGNANCY: ICD-10-CM

## 2017-08-24 LAB
BILIRUB UR QL STRIP: 0
CLARITY UR: ABNORMAL
COLOR UR: YELLOW
GLUCOSE (HISTORICAL): 0
HGB UR QL STRIP.AUTO: 0
KETONES UR STRIP-MCNC: 0 MG/DL
LEUKOCYTE ESTERASE UR QL STRIP: 0
NITRITE UR QL STRIP: 0
PH UR STRIP.AUTO: 6.5 [PH]
PROT UR STRIP-MCNC: ABNORMAL MG/DL
SP GR UR STRIP.AUTO: 1.02
UROBILINOGEN UR QL STRIP.AUTO: 0

## 2017-08-24 PROCEDURE — 99213 OFFICE O/P EST LOW 20 MIN: CPT

## 2017-08-24 NOTE — PROGRESS NOTES
L&D Triage Note - OB/GYN  Herman Dejesus 32 y o  female MRN: [id number]  Unit/Bed#: LD Triage  Encounter: [id number]      Assessment:  32 y o   at 30w3d with bilateral lower abdominal and back pain,     Plan:  1  Abdominal and lower back pain: continued to  patient on likelihood that pain has a musculoskeletal origin, referral to physical therapy  2  Labor precautions given  3  For follow-up at next prenatal appointment  Discussed with Dr Dyana Lovelace, DO        ______________________________________________________________________      Chief Compliant: " I have abdominal pain"    TIME: 10:40 AM  Subjective:  32 y o  Aurelia Greene at 30w3d who presents for evaluation of abdominal and lower back pain  She has twice presented to triage: 3 weeks ago, as well as 2 weeks ago, to triage for similar complaints, noting sharp pain in the lower abdomen and groin, rated 6/10  She reports that pain has not improved throughout these three weeks, stating she continues to experience bilateral lower abdominal pain, left worse than right, as well as right-sided lower back pain that will radiate down to her entire right leg  She continues to report the abdominal pain to be constant, rated 5/10, whereas the lower back and leg pain continues to be sharp and shooting in quality, rated 6/10  Continues to deny any alleviating factors  She still states pain is worse with movement and position changes  Continues to deny taking any pain medication      She reports continued nausea and one episode of vomiting today  Denies taking her zofran  Presents from office for further evaluation after increased blood pressures appreciated (140s/80s)  Denies elevated blood pressures earlier in pregnancy        Objective:  Vitals:    17 1021   BP: 128/57   Pulse: (!) 107   Resp:    Temp:      Focused Abdominal exam: gravid, - guarding +tenderness to palpation bilateral lower quadrants    SVE: deferred  FHT: 150 / Moderate 6 - 25 bpm / + accelerations, no decelerations  [place]: quiet          [place],  8/24/2017 5:25 PM

## 2017-08-24 NOTE — DISCHARGE INSTRUCTIONS
Pregnancy at 27 to 30 [address]:   You may notice new symptoms such as shortness of breath, heartburn, or swelling of your ankles and feet  You may also have trouble sleeping or contractions  DISCHARGE INSTRUCTIONS:   Seek care immediately if:   · You develop a severe headache that does not go away  · You have new or increased vision changes, such as blurred or spotted vision  · You have new or increased swelling in your face or hands  · You have vaginal spotting or bleeding  · Your water broke or you feel warm water gushing or trickling from your vagina  Contact your healthcare provider if:   · You have more than 5 contractions in 1 hour  · You notice any changes in your baby's movements  · You have abdominal cramps, pressure, or tightening  · You have a change in vaginal discharge  · You have chills or a fever  · You have vaginal itching, burning, or pain  · You have yellow, green, white, or foul-smelling vaginal discharge  · You have pain or burning when you urinate, less urine than usual, or pink or bloody urine  · You have questions or concerns about your condition or care  How to care for yourself at this stage of your pregnancy:   · Eat a variety of healthy foods  Healthy foods include fruits, vegetables, whole-grain breads, low-fat dairy foods, beans, lean meats, and fish  Drink liquids as directed  Ask how much liquid to drink each day and which liquids are best for you  Limit caffeine to less than 200 milligrams each day  Limit your intake of fish to 2 servings each week  Choose fish low in mercury such as canned light tuna, shrimp, salmon, cod, or tilapia  Do not  eat fish high in mercury such as swordfish, tilefish, florecita mackerel, and shark  · Manage heartburn  by eating 4 or 5 small meals each day instead of large meals  Avoid spicy food  · Manage swelling  by lying down and putting your feet up       · Take prenatal vitamins as directed  Your need for certain vitamins and minerals, such as folic acid, increases during pregnancy  Prenatal vitamins provide some of the extra vitamins and minerals you need  Prenatal vitamins may also help to decrease the risk of certain birth defects  · Talk to your healthcare provider about exercise  Moderate exercise can help you stay fit  Your healthcare provider will help you plan an exercise program that is safe for you during pregnancy  · Do not smoke  If you smoke, it is never too late to quit  Smoking increases your risk of a miscarriage and other health problems during your pregnancy  Smoking can cause your baby to be born too early or weigh less at birth  Ask your healthcare provider for information if you need help quitting  · Do not drink alcohol  Alcohol passes from your body to your baby through the placenta  It can affect your baby's brain development and cause fetal alcohol syndrome (FAS)  FAS is a group of conditions that causes mental, behavior, and growth problems  · Talk to your healthcare provider before you take any medicines  Many medicines may harm your baby if you take them when you are pregnant  Do not take any medicines, vitamins, herbs, or supplements without first talking to your healthcare provider  Never use illegal or street drugs (such as marijuana or cocaine) while you are pregnant  Safety tips during pregnancy:   · Avoid hot tubs and saunas  Do not use a hot tub or sauna while you are pregnant, especially during your first trimester  Hot tubs and saunas may raise your baby's temperature and increase the risk of birth defects  · Avoid toxoplasmosis  This is an infection caused by eating raw meat or being around infected cat feces  It can cause birth defects, miscarriages, and other problems  Wash your hands after you touch raw meat  Make sure any meat is well-cooked before you eat it  Avoid raw eggs and unpasteurized milk   Use gloves or ask someone else to clean your cat's litter box while you are pregnant  Changes that are happening with your baby:  By 30 weeks, your baby may weigh more than 3 pounds  Your baby may be about 11 inches long from the top of the head to the rump (baby's bottom)  Your baby's eyes open and close now  Your baby's kicks and movements are more forceful at this time  What you need to know about prenatal care: Your healthcare provider will check your blood pressure and weight  You may also need the following:  · Blood tests  may be done to check for anemia or blood type  · A urine test  may also be done to check for sugar and protein  These can be signs of gestational diabetes or infection  Protein in your urine may also be a sign of preeclampsia  Preeclampsia is a condition that can develop during week 20 or later of your pregnancy  It causes high blood pressure, and it can cause problems with your kidneys and other organs  · A Tdap vaccine and flu vaccine  may be recommended by your healthcare provider  · A gestational diabetes screen  will be done using an oral glucose tolerance test (OGTT)  An OGTT starts with a blood sugar level check after you have not eaten for 8 hours  You are then given a glucose drink  Your blood sugar level is checked after 1 hour, 2 hours, and sometimes 3 hours  Healthcare providers look at how much your blood sugar level increases from the first check  · Fundal height  is a measurement of your uterus to check your baby's growth  This number is usually the same as the number of weeks that you have been pregnant  Your healthcare provider may also check your baby's position  · Your baby's heart rate  will be checked  © 2017 [facility] Information is for End User's use only and may not be sold, redistributed or otherwise used for commercial purposes   All illustrations and images included in CareNotes® are the copyrighted property of [employer]  or [employer]  The above information is an  only  It is not intended as medical advice for individual conditions or treatments  Talk to your doctor, nurse or pharmacist before following any medical regimen to see if it is safe and effective for you

## 2017-08-28 ENCOUNTER — GENERIC CONVERSION - ENCOUNTER (OUTPATIENT)
Dept: OTHER | Facility: OTHER | Age: 27
End: 2017-08-28

## 2017-08-29 ENCOUNTER — GENERIC CONVERSION - ENCOUNTER (OUTPATIENT)
Dept: OTHER | Facility: OTHER | Age: 27
End: 2017-08-29

## 2017-09-05 ENCOUNTER — GENERIC CONVERSION - ENCOUNTER (OUTPATIENT)
Dept: OTHER | Facility: OTHER | Age: 27
End: 2017-09-05

## 2017-09-13 ENCOUNTER — GENERIC CONVERSION - ENCOUNTER (OUTPATIENT)
Dept: OTHER | Facility: OTHER | Age: 27
End: 2017-09-13

## 2017-09-14 ENCOUNTER — LAB CONVERSION - ENCOUNTER (OUTPATIENT)
Dept: OTHER | Facility: OTHER | Age: 27
End: 2017-09-14

## 2017-09-14 LAB
BACTERIA UR QL AUTO: NORMAL /HPF
BASOPHILS # BLD AUTO: 0.2 %
BASOPHILS # BLD AUTO: 18 CELLS/UL (ref 0–200)
BILIRUB UR QL STRIP: NEGATIVE
COLOR UR: NORMAL
COMMENT (HISTORICAL): CLEAR
CULTURE RESULT (HISTORICAL): NORMAL
DEPRECATED RDW RBC AUTO: 12.6 % (ref 11–15)
EOSINOPHIL # BLD AUTO: 0.2 %
EOSINOPHIL # BLD AUTO: 18 CELLS/UL (ref 15–500)
FECAL OCCULT BLOOD DIAGNOSTIC (HISTORICAL): NEGATIVE
GLUCOSE (HISTORICAL): NEGATIVE
HCT VFR BLD AUTO: 33.5 % (ref 35–45)
HGB BLD-MCNC: 10.5 G/DL (ref 11.7–15.5)
HYALINE CASTS #/AREA URNS LPF: NORMAL /LPF
KETONES UR STRIP-MCNC: NEGATIVE MG/DL
LEUKOCYTE ESTERASE UR QL STRIP: NEGATIVE
LYMPHOCYTES # BLD AUTO: 1442 CELLS/UL (ref 850–3900)
LYMPHOCYTES # BLD AUTO: 16.2 %
MCH RBC QN AUTO: 27.3 PG (ref 27–33)
MCHC RBC AUTO-ENTMCNC: 31.3 G/DL (ref 32–36)
MCV RBC AUTO: 87 FL (ref 80–100)
MONOCYTES # BLD AUTO: 650 CELLS/UL (ref 200–950)
MONOCYTES (HISTORICAL): 7.3 %
NEUTROPHILS # BLD AUTO: 6773 CELLS/UL (ref 1500–7800)
NEUTROPHILS # BLD AUTO: 76.1 %
NITRITE UR QL STRIP: NEGATIVE
PH UR STRIP.AUTO: 6.5 [PH] (ref 5–8)
PLATELET # BLD AUTO: 235 THOUSAND/UL (ref 140–400)
PMV BLD AUTO: 12.6 FL (ref 7.5–12.5)
PROT UR STRIP-MCNC: NEGATIVE MG/DL
RBC # BLD AUTO: 3.85 MILLION/UL (ref 3.8–5.1)
RBC (HISTORICAL): NORMAL /HPF
SP GR UR STRIP.AUTO: 1.02 (ref 1–1.03)
SQUAMOUS EPITHELIAL CELLS (HISTORICAL): NORMAL /HPF
WBC # BLD AUTO: 8.9 THOUSAND/UL (ref 3.8–10.8)
WBC # BLD AUTO: NORMAL /HPF

## 2017-09-15 ENCOUNTER — GENERIC CONVERSION - ENCOUNTER (OUTPATIENT)
Dept: OTHER | Facility: OTHER | Age: 27
End: 2017-09-15

## 2017-09-18 ENCOUNTER — GENERIC CONVERSION - ENCOUNTER (OUTPATIENT)
Dept: OTHER | Facility: OTHER | Age: 27
End: 2017-09-18

## 2017-09-29 ENCOUNTER — HOSPITAL ENCOUNTER (OUTPATIENT)
Facility: HOSPITAL | Age: 27
Discharge: HOME | End: 2017-09-29
Attending: OBSTETRICS & GYNECOLOGY | Admitting: OBSTETRICS & GYNECOLOGY
Payer: COMMERCIAL

## 2017-09-29 VITALS
WEIGHT: 141 LBS | HEART RATE: 80 BPM | DIASTOLIC BLOOD PRESSURE: 67 MMHG | RESPIRATION RATE: 18 BRPM | TEMPERATURE: 98.3 F | SYSTOLIC BLOOD PRESSURE: 117 MMHG | BODY MASS INDEX: 26.62 KG/M2 | HEIGHT: 61 IN

## 2017-09-29 DIAGNOSIS — R10.9 ABDOMINAL PAIN AFFECTING PREGNANCY, ANTEPARTUM: ICD-10-CM

## 2017-09-29 DIAGNOSIS — O26.899 ABDOMINAL PAIN AFFECTING PREGNANCY, ANTEPARTUM: ICD-10-CM

## 2017-09-29 DIAGNOSIS — Z3A.35 35 WEEKS GESTATION OF PREGNANCY: ICD-10-CM

## 2017-09-29 PROCEDURE — 99214 OFFICE O/P EST MOD 30 MIN: CPT

## 2017-09-30 NOTE — PROGRESS NOTES
L&D Triage Note - OB/GYN  Jordenkaelyn Aguirre 32 y o  female MRN: [id number]  Unit/Bed#: LD PACU-03 Encounter: [id number]      Assessment:  32 y o   at 35w4d with rectal pressure    Plan:  1  Rectal pressure  -likely secondary to fetal position  -cervical exam is closed  2  Discharge home  -encourage hydration and stool softeners to keep BMs regular  -d/w Dr Todd Cruz      ______________________________________________________________________      Chief Compliant: rectal pressure    TIME: 2100  Subjective:  32 y o  Rossy Rachel at 35w4d complaining of rectal pressure  She has been having vaginal cramping and occasional devendra shaffer contractions since around 30 weeks gestation  She more recently has noticed a sensation of rectal pressure  She attempted to use the bathroom and have a BM, but nothing came out  This pressure was associated with lower back pain  No vaginal bleeding, or LOF  She reports good fetal movement        Objective:  Vitals:    17   BP: 117/67   Pulse: 80   Resp: 18   Temp: 98 3 °F (36 8 °C)       SVE: 0 / 0% / -3  FHT:  120 / Moderate 6 - 25 bpm / + accelerations, reactive  [place]: irritable          Dez Shipley MD 2017 9:33 PM

## 2017-10-03 ENCOUNTER — GENERIC CONVERSION - ENCOUNTER (OUTPATIENT)
Dept: OTHER | Facility: OTHER | Age: 27
End: 2017-10-03

## 2017-10-04 ENCOUNTER — LAB REQUISITION (OUTPATIENT)
Dept: LAB | Facility: HOSPITAL | Age: 27
End: 2017-10-04
Payer: COMMERCIAL

## 2017-10-04 DIAGNOSIS — Z34.90 ENCOUNTER FOR SUPERVISION OF NORMAL PREGNANCY: ICD-10-CM

## 2017-10-04 PROCEDURE — 87653 STREP B DNA AMP PROBE: CPT | Performed by: OBSTETRICS & GYNECOLOGY

## 2017-10-06 LAB — GP B STREP DNA SPEC QL NAA+PROBE: ABNORMAL

## 2017-10-09 ENCOUNTER — GENERIC CONVERSION - ENCOUNTER (OUTPATIENT)
Dept: OTHER | Facility: OTHER | Age: 27
End: 2017-10-09

## 2017-10-09 ENCOUNTER — HOSPITAL ENCOUNTER (OUTPATIENT)
Facility: HOSPITAL | Age: 27
Discharge: HOME | End: 2017-10-09
Attending: OBSTETRICS & GYNECOLOGY | Admitting: OBSTETRICS & GYNECOLOGY
Payer: COMMERCIAL

## 2017-10-09 VITALS
SYSTOLIC BLOOD PRESSURE: 109 MMHG | HEART RATE: 88 BPM | BODY MASS INDEX: 27.78 KG/M2 | TEMPERATURE: 98.4 F | RESPIRATION RATE: 16 BRPM | DIASTOLIC BLOOD PRESSURE: 69 MMHG | WEIGHT: 147 LBS | OXYGEN SATURATION: 98 %

## 2017-10-09 DIAGNOSIS — Z3A.37 37 WEEKS GESTATION OF PREGNANCY: ICD-10-CM

## 2017-10-09 PROBLEM — R11.10 VOMITING AND DIARRHEA: Status: ACTIVE | Noted: 2017-10-09

## 2017-10-09 PROBLEM — R19.7 VOMITING AND DIARRHEA: Status: ACTIVE | Noted: 2017-10-09

## 2017-10-09 PROCEDURE — 99214 OFFICE O/P EST MOD 30 MIN: CPT

## 2017-10-09 RX ORDER — ONDANSETRON 2 MG/ML
4 INJECTION INTRAMUSCULAR; INTRAVENOUS ONCE
Status: COMPLETED | OUTPATIENT
Start: 2017-10-09 | End: 2017-10-09

## 2017-10-09 RX ADMIN — ONDANSETRON 4 MG: 2 INJECTION INTRAMUSCULAR; INTRAVENOUS at 09:06

## 2017-10-09 RX ADMIN — SODIUM CHLORIDE 1000 ML: 0.9 INJECTION, SOLUTION INTRAVENOUS at 09:00

## 2017-10-09 RX ADMIN — SODIUM CHLORIDE 1000 ML: 0.9 INJECTION, SOLUTION INTRAVENOUS at 09:40

## 2017-10-09 NOTE — DISCHARGE INSTRUCTIONS
Acute Nausea and Vomiting   WHAT YOU NEED TO KNOW:   Acute nausea and vomiting start suddenly, worsen quickly, and last a short time  DISCHARGE INSTRUCTIONS:   Seek care immediately if:   · You see blood in your vomit or your bowel movements  · You have sudden, severe pain in your chest and upper abdomen after hard vomiting or retching  · You have swelling in your neck and chest      · You are dizzy, cold, and thirsty and your eyes and mouth are dry  · You are urinating very little or not at all  · You have muscle weakness, leg cramps, and trouble breathing  · Your heart is beating much faster than normal      · You continue to vomit for more than 48 hours  Contact your healthcare provider if:   · You have frequent dry heaves (vomiting but nothing comes out)  · Your nausea and vomiting does not get better or go away after you use medicine  · You have questions or concerns about your condition or treatment  Medicines: You may need any of the following:  · Medicines  may be given to calm your stomach and stop your vomiting  You may also need medicines to help you feel more relaxed or to stop nausea and vomiting caused by motion sickness  · Gastrointestinal stimulants  are used to help empty your stomach and bowels  This may help decrease nausea and vomiting  · Take your medicine as directed  Contact your healthcare provider if you think your medicine is not helping or if you have side effects  Tell him or her if you are allergic to any medicine  Keep a list of the medicines, vitamins, and herbs you take  Include the amounts, and when and why you take them  Bring the list or the pill bottles to follow-up visits  Carry your medicine list with you in case of an emergency  Prevent or manage acute nausea and vomiting:  · Control stress  Headaches due to stress may cause nausea and vomiting  Find ways to relax and manage your stress  Get more rest and sleep      · Drink more liquids as directed  Vomiting can lead to dehydration  It is important to drink more liquids to help replace lost body fluids  Ask your healthcare provider how much liquid to drink each day and which liquids are best for you  Your provider may recommend that you drink an oral rehydration solution (ORS)  ORS contains water, salts, and sugar that are needed to replace the lost body fluids  Ask what kind of ORS to use, how much to drink, and where to get it  · Eat smaller meals, more often  Eat small amounts of food every 2 to 3 hours, even if you are not hungry  Food in your stomach may decrease your nausea  · Talk to your healthcare provider before you take over-the-counter (OTC) medicines  These medicines can cause serious problems if you use certain other medicines, or you have a medical condition  You may have problems if you use too much or use them for longer than the label says  Follow directions on the label carefully  Follow up with your healthcare provider as directed:  Write down your questions so you remember to ask them during your visits  © 2017 [facility] Information is for End User's use only and may not be sold, redistributed or otherwise used for commercial purposes  All illustrations and images included in CareNotes® are the copyrighted property of A D A M [employer] Inc  or [employer]  The above information is an  only  It is not intended as medical advice for individual conditions or treatments  Talk to your doctor, nurse or pharmacist before following any medical regimen to see if it is safe and effective for you

## 2017-10-09 NOTE — PROGRESS NOTES
L&D Triage Note - OB/GYN  Michael Finn 32 y o  female MRN: [id number]  Unit/Bed#: LD Triage  Encounter: [id number]      Assessment:  32 y o   at 37w0d with N/V/D since Saturday    Plan:  1  N/V/D  -likely viral gastroenteritis  -symptoms have improved since Saturday  -IV fluids and IV zofran given  -recommend BRAT diet   -able to tolerate ann crackers in Triage  2  Discharge home  -patient has an appointment tomorrow in the office      ______________________________________________________________________      Chief Compliant: N/V/D    TIME: 900  Subjective:  32 y o  Maribel Levy at 37w0d complaining nausea vomiting diarrhea which started on Saturday  Her symptoms improved slightly on  but she still had a few episodes of vomiting and diarrhea  When she woke up this a m  she had 1 episode of diarrhea 1 episode of vomiting  She has not been able to tolerate food  Baby is moving fine, no vaginal bleeding, no leaking of fluids  Patient has experienced some contractions  However, she does not have any contractions today  She does have some minor abdominal pain likely secondary to vomiting        Objective:  Vitals:    10/09/17 0838   BP: 114/65   Pulse: 75   Resp:    Temp:    SpO2:        SVE: ft/ thick /high, posterior, firm  FHT:  125 / Moderate 6 - 25 bpm / + accelerations, reactive  [place]: irritable        Ivone Reece MD  OBGYN, PGY-1  10/9/2017 11:31 AM

## 2017-10-10 ENCOUNTER — GENERIC CONVERSION - ENCOUNTER (OUTPATIENT)
Dept: OTHER | Facility: OTHER | Age: 27
End: 2017-10-10

## 2017-10-16 ENCOUNTER — GENERIC CONVERSION - ENCOUNTER (OUTPATIENT)
Dept: OTHER | Facility: OTHER | Age: 27
End: 2017-10-16

## 2017-10-17 ENCOUNTER — GENERIC CONVERSION - ENCOUNTER (OUTPATIENT)
Dept: OTHER | Facility: OTHER | Age: 27
End: 2017-10-17

## 2017-10-23 ENCOUNTER — GENERIC CONVERSION - ENCOUNTER (OUTPATIENT)
Dept: OTHER | Facility: OTHER | Age: 27
End: 2017-10-23

## 2017-10-24 ENCOUNTER — GENERIC CONVERSION - ENCOUNTER (OUTPATIENT)
Dept: OTHER | Facility: OTHER | Age: 27
End: 2017-10-24

## 2017-10-24 ENCOUNTER — HOSPITAL ENCOUNTER (INPATIENT)
Facility: HOSPITAL | Age: 27
LOS: 3 days | Discharge: HOME | DRG: 560 | End: 2017-10-27
Attending: OBSTETRICS & GYNECOLOGY | Admitting: OBSTETRICS & GYNECOLOGY
Payer: COMMERCIAL

## 2017-10-24 DIAGNOSIS — Z3A.39 39 WEEKS GESTATION OF PREGNANCY: Primary | ICD-10-CM

## 2017-10-24 LAB
ABO GROUP BLD: NORMAL
BASOPHILS # BLD AUTO: 0.02 THOUSANDS/ΜL (ref 0–0.1)
BASOPHILS NFR BLD AUTO: 0 % (ref 0–1)
BLD GP AB SCN SERPL QL: NEGATIVE
EOSINOPHIL # BLD AUTO: 0.03 THOUSAND/ΜL (ref 0–0.61)
EOSINOPHIL NFR BLD AUTO: 0 % (ref 0–6)
ERYTHROCYTE [DISTWIDTH] IN BLOOD BY AUTOMATED COUNT: 14.6 % (ref 11.6–15.1)
HCT VFR BLD AUTO: 36.7 % (ref 34.8–46.1)
HGB BLD-MCNC: 12 G/DL (ref 11.5–15.4)
LYMPHOCYTES # BLD AUTO: 2.29 THOUSANDS/ΜL (ref 0.6–4.47)
LYMPHOCYTES NFR BLD AUTO: 21 % (ref 14–44)
MCH RBC QN AUTO: 28 PG (ref 26.8–34.3)
MCHC RBC AUTO-ENTMCNC: 32.7 G/DL (ref 31.4–37.4)
MCV RBC AUTO: 86 FL (ref 82–98)
MONOCYTES # BLD AUTO: 0.59 THOUSAND/ΜL (ref 0.17–1.22)
MONOCYTES NFR BLD AUTO: 5 % (ref 4–12)
NEUTROPHILS # BLD AUTO: 8 THOUSANDS/ΜL (ref 1.85–7.62)
NEUTS SEG NFR BLD AUTO: 74 % (ref 43–75)
NRBC BLD AUTO-RTO: 0 /100 WBCS
PLATELET # BLD AUTO: 190 THOUSANDS/UL (ref 149–390)
PMV BLD AUTO: 13 FL (ref 8.9–12.7)
RBC # BLD AUTO: 4.28 MILLION/UL (ref 3.81–5.12)
RH BLD: POSITIVE
SPECIMEN EXPIRATION DATE: NORMAL
WBC # BLD AUTO: 10.95 THOUSAND/UL (ref 4.31–10.16)

## 2017-10-24 PROCEDURE — 86901 BLOOD TYPING SEROLOGIC RH(D): CPT | Performed by: OBSTETRICS & GYNECOLOGY

## 2017-10-24 PROCEDURE — 85025 COMPLETE CBC W/AUTO DIFF WBC: CPT | Performed by: OBSTETRICS & GYNECOLOGY

## 2017-10-24 PROCEDURE — 86900 BLOOD TYPING SEROLOGIC ABO: CPT | Performed by: OBSTETRICS & GYNECOLOGY

## 2017-10-24 PROCEDURE — 86592 SYPHILIS TEST NON-TREP QUAL: CPT | Performed by: OBSTETRICS & GYNECOLOGY

## 2017-10-24 PROCEDURE — 99213 OFFICE O/P EST LOW 20 MIN: CPT

## 2017-10-24 PROCEDURE — 86850 RBC ANTIBODY SCREEN: CPT | Performed by: OBSTETRICS & GYNECOLOGY

## 2017-10-24 RX ORDER — TRISODIUM CITRATE DIHYDRATE AND CITRIC ACID MONOHYDRATE 500; 334 MG/5ML; MG/5ML
30 SOLUTION ORAL ONCE
Status: COMPLETED | OUTPATIENT
Start: 2017-10-24 | End: 2017-10-24

## 2017-10-24 RX ORDER — FAMOTIDINE 20 MG/1
20 TABLET, FILM COATED ORAL 2 TIMES DAILY
Status: DISCONTINUED | OUTPATIENT
Start: 2017-10-24 | End: 2017-10-25

## 2017-10-24 RX ORDER — BUTORPHANOL TARTRATE 1 MG/ML
1 INJECTION, SOLUTION INTRAMUSCULAR; INTRAVENOUS
Status: DISCONTINUED | OUTPATIENT
Start: 2017-10-24 | End: 2017-10-25

## 2017-10-24 RX ORDER — ONDANSETRON 2 MG/ML
4 INJECTION INTRAMUSCULAR; INTRAVENOUS EVERY 6 HOURS PRN
Status: DISCONTINUED | OUTPATIENT
Start: 2017-10-24 | End: 2017-10-25

## 2017-10-24 RX ORDER — OXYTOCIN/RINGER'S LACTATE 30/500 ML
1-30 PLASTIC BAG, INJECTION (ML) INTRAVENOUS
Status: DISCONTINUED | OUTPATIENT
Start: 2017-10-24 | End: 2017-10-25

## 2017-10-24 RX ORDER — SODIUM CHLORIDE, SODIUM LACTATE, POTASSIUM CHLORIDE, CALCIUM CHLORIDE 600; 310; 30; 20 MG/100ML; MG/100ML; MG/100ML; MG/100ML
125 INJECTION, SOLUTION INTRAVENOUS CONTINUOUS
Status: DISCONTINUED | OUTPATIENT
Start: 2017-10-24 | End: 2017-10-25

## 2017-10-24 RX ORDER — ALBUTEROL SULFATE 90 UG/1
2 AEROSOL, METERED RESPIRATORY (INHALATION) EVERY 6 HOURS PRN
Status: DISCONTINUED | OUTPATIENT
Start: 2017-10-24 | End: 2017-10-27 | Stop reason: HOSPADM

## 2017-10-24 RX ORDER — PROMETHAZINE HYDROCHLORIDE 25 MG/ML
25 INJECTION, SOLUTION INTRAMUSCULAR; INTRAVENOUS EVERY 6 HOURS PRN
Status: DISCONTINUED | OUTPATIENT
Start: 2017-10-24 | End: 2017-10-25

## 2017-10-24 RX ORDER — PROMETHAZINE HYDROCHLORIDE 25 MG/ML
25 INJECTION, SOLUTION INTRAMUSCULAR; INTRAVENOUS EVERY 6 HOURS PRN
Status: DISCONTINUED | OUTPATIENT
Start: 2017-10-24 | End: 2017-10-24

## 2017-10-24 RX ADMIN — SODIUM CHLORIDE 5 MILLION UNITS: 0.9 INJECTION, SOLUTION INTRAVENOUS at 15:37

## 2017-10-24 RX ADMIN — ONDANSETRON 4 MG: 2 INJECTION INTRAMUSCULAR; INTRAVENOUS at 21:42

## 2017-10-24 RX ADMIN — SODIUM CHLORIDE, SODIUM LACTATE, POTASSIUM CHLORIDE, AND CALCIUM CHLORIDE 125 ML/HR: .6; .31; .03; .02 INJECTION, SOLUTION INTRAVENOUS at 14:40

## 2017-10-24 RX ADMIN — SODIUM CHLORIDE, SODIUM LACTATE, POTASSIUM CHLORIDE, AND CALCIUM CHLORIDE 125 ML/HR: .6; .31; .03; .02 INJECTION, SOLUTION INTRAVENOUS at 23:24

## 2017-10-24 RX ADMIN — SODIUM CHLORIDE 2.5 MILLION UNITS: 9 INJECTION, SOLUTION INTRAVENOUS at 19:21

## 2017-10-24 RX ADMIN — FAMOTIDINE 20 MG: 20 TABLET, FILM COATED ORAL at 17:29

## 2017-10-24 RX ADMIN — PROMETHAZINE HYDROCHLORIDE 25 MG: 25 INJECTION INTRAMUSCULAR; INTRAVENOUS at 18:21

## 2017-10-24 RX ADMIN — Medication 1 TABLET: at 16:13

## 2017-10-24 RX ADMIN — SODIUM CITRATE AND CITRIC ACID MONOHYDRATE 30 ML: 500; 334 SOLUTION ORAL at 16:14

## 2017-10-24 RX ADMIN — BUTORPHANOL TARTRATE 1 MG: 1 INJECTION, SOLUTION INTRAMUSCULAR; INTRAVENOUS at 21:46

## 2017-10-24 RX ADMIN — Medication 2 MILLI-UNITS/MIN: at 20:00

## 2017-10-24 RX ADMIN — SODIUM CHLORIDE 2.5 MILLION UNITS: 9 INJECTION, SOLUTION INTRAVENOUS at 23:23

## 2017-10-24 RX ADMIN — BUTORPHANOL TARTRATE 1 MG: 1 INJECTION, SOLUTION INTRAMUSCULAR; INTRAVENOUS at 18:21

## 2017-10-24 NOTE — PROCEDURES
Procedures    Dr Lay Vasquez placed venegas balloon  @[id number] via direct visualization of cervix using speculum  Cervix was cleaned with betadine  Ringed forceps were used to grasp catheter and slowly introduce through internal os  Balloon was filled 60cc of NS and weighted  Pt  Tolerated procedure well       Ottoniel Monday, MD  OBGYN, PGY-1  10/24/2017 4:45 PM

## 2017-10-24 NOTE — OB LABOR/OXYTOCIN SAFETY PROGRESS
Labor Progress Note - Carin Durán 32 y o  female MRN: [id number]    Unit/Bed#:  Triage 2 Encounter: [id number]    Obstetric History       T0      L0     SAB0   TAB0   Ectopic0   Multiple0   Live Births0      Gestational Age: 39w1d     Contraction Frequency (minutes): 1-10  Contraction Quality: Mild  Tachysystole: No   Dilation: 4-5        Effacement (%): 80  Station: -2  Baseline Rate: 110 bpm  Fetal Heart Rate: 110 BPM  FHR Category: Category I          Notes/comments:      Anderson bulb out at 1930  Pt made excellent change  Plan to recheck in 2 hours once in labor bed  Pt may receive epidural if needed   Will consider AROM s/p epidural     D/w Dr Ed Melchor [facility], DO 10/24/2017 7:39 PM

## 2017-10-24 NOTE — H&P
H&P Exam - Obstetrics   Jason Perez 32 y o  female MRN: [id number]  Unit/Bed#: LD Triage  Encounter: [id number]    >2 Midnights    INPATIENT     History of Present Illness   Chief Complaint: elective induction of labor    HPI:  Jason Perez is a 32 y o   female with an SHIRA of 10/30/2017, by Last Menstrual Period at 39w1d weeks gestation who is being admitted for elective induction of labor  Pt initially presented to triage for swelling in lower extremities with no other symptoms, but while on FHM, variable and spontaneous late decels were noted  Discussed with patient recommendation to proceed with induction  Pt agreed  Contractions: irregular  Leakage of fluid: None  Bleeding: None  Fetal movement: present  Her current obstetrical history is significant for GBS colonizer  Pregnancy complications: none      Review of Systems    Historical Information   OB History    Para Term  AB Living   2       1     SAB TAB Ectopic Multiple Live Births   1              # Outcome Date GA Lbr [place]/2nd Weight Sex Delivery Anes PTL Lv   2 Current            1 SAB                 Baby complications/comments: none  Past Medical History:   Diagnosis Date    Abnormal Pap smear of cervix     Asthma     Eczema     Endometriosis     Heart murmur     Migraine     MRSA infection 2011    Pyelonephritis     Urinary retention     Varicella     childhood     Past Surgical History:   Procedure Laterality Date    EXPLORATORY LAPAROTOMY       Social History   History   Alcohol Use No     History   Drug Use No     History   Smoking Status    Never Smoker   Smokeless Tobacco    Not on file     Family History: non-contributory    Meds/Allergies   {  Prescriptions Prior to Admission   Medication    albuterol (PROVENTIL HFA,VENTOLIN HFA) 90 mcg/act inhaler    butalbital-aspirin-caffeine (FIORINAL) -40 mg per capsule    ondansetron (ZOFRAN) 4 mg tablet    Prenatal Vit w/Do-Llpgigswx-VC (PNV PO)     No Known Allergies    Objective   Vitals: Blood pressure 122/75, pulse 67, temperature 97 7 °F (36 5 °C), temperature source Oral, resp  rate 18, last menstrual period 01/23/2017, currently breastfeeding  There is no height or weight on file to calculate BMI  Invasive Devices          No matching active lines, drains, or airways          Physical Exam   Constitutional: She is oriented to person, place, and time  She appears well-developed and well-nourished  No distress  HENT:   Head: Normocephalic and atraumatic  Cardiovascular: Normal rate, regular rhythm and normal heart sounds  Abdominal:   Gravid   Neurological: She is alert and oriented to person, place, and time  Skin: She is not diaphoretic       Vaginal Exam  1/50/-3     Membranes: Intact  FHR: Baseline: 130 bpm, Variability: Good {> 6 bpm), Accelerations: Reactive and Decelerations: intermittent variable and spontaneous Late decels  [place]: irregular, difficult to interpret    Prenatal Labs:   Blood Type:   Lab Results   Component Value Date/Time    ABO Grouping A 04/14/2017 03:27 PM     , D (Rh type):   Lab Results   Component Value Date/Time    Rh Factor Positive 04/14/2017 03:27 PM     , Antibody Screen:   Lab Results   Component Value Date/Time    Antibody Screen Negative 04/14/2017 03:27 PM    , HCT/HGB:   Lab Results   Component Value Date/Time    Hematocrit 33 2 (L) 08/22/2017 03:36 PM    Hematocrit 40 3 11/05/2014 10:52 PM    Hemoglobin 10 7 (L) 08/22/2017 03:36 PM    Hemoglobin 13 3 11/05/2014 10:52 PM      , 1 hour Glucola:   Lab Results   Component Value Date/Time    Glucose 90 08/01/2017   , Varicella: positive history    , Rubella:   Lab Results   Component Value Date/Time    Rubella IgG Quant 26 9 04/14/2017 03:27 PM        , VDRL/RPR:   Lab Results   Component Value Date/Time    RPR Non-Reactive 04/14/2017 03:27 PM      , Hep B:   Lab Results   Component Value Date/Time    Hepatitis B Surface Ag Non-reactive 04/14/2017 03:27 PM    HIV:   Lab Results   Component Value Date/Time    HIV-1/HIV-2 Ab Non-Reactive 04/14/2017 03:27 PM     , Chlamydia: negative  , Gonorrhea:   Lab Results   Component Value Date/Time    N GONORRHOEAE, AMPLIFIED DNA Negative 10/06/2014 11:17 PM    N gonorrhoeae, DNA Probe N  gonorrhoeae Amplified DNA Negative 04/28/2017 09:25 PM     , Group B Strep:    Lab Results   Component Value Date/Time    Strep Grp B PCR Positive for Beta Hemolytic Strep Grp B by PCR (A) 10/04/2017 11:04 AM          Imaging, EKG, Pathology, and Other Studies: I have personally reviewed pertinent reports        Assessment/Plan     Assessment:  IUP at 39w1d admitted for elective IOL  Plan:  1) Admit to L&D  2) CBC, T&S, RPR  3) Penicillin for GBS ppx  4)Cervical ripening with venegas bulb  5) Induction with pitocin titration  6) Epidural upon request      D/w Dr Bijal Monson MD  OBGYN, PGY-1  10/24/2017 2:44 PM

## 2017-10-25 ENCOUNTER — ANESTHESIA (INPATIENT)
Dept: LABOR AND DELIVERY | Facility: HOSPITAL | Age: 27
DRG: 560 | End: 2017-10-25
Payer: COMMERCIAL

## 2017-10-25 PROBLEM — R11.10 VOMITING AND DIARRHEA: Status: RESOLVED | Noted: 2017-10-09 | Resolved: 2017-10-25

## 2017-10-25 PROBLEM — R19.7 VOMITING AND DIARRHEA: Status: RESOLVED | Noted: 2017-10-09 | Resolved: 2017-10-25

## 2017-10-25 PROBLEM — Z3A.30 30 WEEKS GESTATION OF PREGNANCY: Status: RESOLVED | Noted: 2017-07-31 | Resolved: 2017-10-25

## 2017-10-25 PROBLEM — Z3A.37 37 WEEKS GESTATION OF PREGNANCY: Status: RESOLVED | Noted: 2017-10-09 | Resolved: 2017-10-25

## 2017-10-25 PROBLEM — Z3A.39 39 WEEKS GESTATION OF PREGNANCY: Status: RESOLVED | Noted: 2017-10-24 | Resolved: 2017-10-25

## 2017-10-25 LAB
BASE EXCESS BLDCOA CALC-SCNC: -1.9 MMOL/L (ref 3–11)
BASE EXCESS BLDCOV CALC-SCNC: -2.2 MMOL/L (ref 1–9)
HCO3 BLDCOA-SCNC: 25.2 MMOL/L (ref 17.3–27.3)
HCO3 BLDCOV-SCNC: 22.5 MMOL/L (ref 12.2–28.6)
O2 CT VFR BLDCOA CALC: 9.5 ML/DL
OXYHGB MFR BLDCOA: 44.6 %
OXYHGB MFR BLDCOV: 59.9 %
PCO2 BLDCOA: 51.8 MM[HG] (ref 30–60)
PCO2 BLDCOV: 38.6 MM HG (ref 27–43)
PH BLDCOA: 7.3 [PH] (ref 7.23–7.43)
PH BLDCOV: 7.38 [PH] (ref 7.19–7.49)
PO2 BLDCOA: 20.5 MM HG (ref 5–25)
PO2 BLDCOV: 24.1 MM HG (ref 15–45)
RPR SER QL: NORMAL
SAO2 % BLDCOV: 12.4 ML/DL

## 2017-10-25 PROCEDURE — 82805 BLOOD GASES W/O2 SATURATION: CPT | Performed by: OBSTETRICS & GYNECOLOGY

## 2017-10-25 RX ORDER — LIDOCAINE HYDROCHLORIDE 10 MG/ML
INJECTION, SOLUTION INFILTRATION; PERINEURAL AS NEEDED
Status: DISCONTINUED | OUTPATIENT
Start: 2017-10-25 | End: 2017-10-25 | Stop reason: SURG

## 2017-10-25 RX ORDER — SIMETHICONE 80 MG
80 TABLET,CHEWABLE ORAL 4 TIMES DAILY PRN
Status: DISCONTINUED | OUTPATIENT
Start: 2017-10-25 | End: 2017-10-27

## 2017-10-25 RX ORDER — DIAPER,BRIEF,INFANT-TODD,DISP
1 EACH MISCELLANEOUS AS NEEDED
Status: DISCONTINUED | OUTPATIENT
Start: 2017-10-25 | End: 2017-10-27

## 2017-10-25 RX ORDER — METOCLOPRAMIDE HYDROCHLORIDE 5 MG/ML
10 INJECTION INTRAMUSCULAR; INTRAVENOUS ONCE
Status: COMPLETED | OUTPATIENT
Start: 2017-10-25 | End: 2017-10-25

## 2017-10-25 RX ORDER — OXYCODONE HYDROCHLORIDE AND ACETAMINOPHEN 5; 325 MG/1; MG/1
1 TABLET ORAL EVERY 4 HOURS PRN
Status: DISCONTINUED | OUTPATIENT
Start: 2017-10-25 | End: 2017-10-27

## 2017-10-25 RX ORDER — OXYTOCIN/RINGER'S LACTATE 30/500 ML
250 PLASTIC BAG, INJECTION (ML) INTRAVENOUS CONTINUOUS
Status: ACTIVE | OUTPATIENT
Start: 2017-10-25 | End: 2017-10-25

## 2017-10-25 RX ORDER — LIDOCAINE HYDROCHLORIDE AND EPINEPHRINE 15; 5 MG/ML; UG/ML
INJECTION, SOLUTION EPIDURAL AS NEEDED
Status: DISCONTINUED | OUTPATIENT
Start: 2017-10-25 | End: 2017-10-25 | Stop reason: SURG

## 2017-10-25 RX ORDER — ACETAMINOPHEN 325 MG/1
650 TABLET ORAL EVERY 4 HOURS PRN
Status: DISCONTINUED | OUTPATIENT
Start: 2017-10-25 | End: 2017-10-27 | Stop reason: HOSPADM

## 2017-10-25 RX ORDER — CALCIUM CARBONATE 200(500)MG
1000 TABLET,CHEWABLE ORAL 3 TIMES DAILY PRN
Status: DISCONTINUED | OUTPATIENT
Start: 2017-10-25 | End: 2017-10-27

## 2017-10-25 RX ORDER — IBUPROFEN 600 MG/1
600 TABLET ORAL EVERY 6 HOURS PRN
Status: DISCONTINUED | OUTPATIENT
Start: 2017-10-25 | End: 2017-10-27 | Stop reason: HOSPADM

## 2017-10-25 RX ORDER — OXYCODONE HYDROCHLORIDE AND ACETAMINOPHEN 5; 325 MG/1; MG/1
2 TABLET ORAL EVERY 4 HOURS PRN
Status: DISCONTINUED | OUTPATIENT
Start: 2017-10-25 | End: 2017-10-27

## 2017-10-25 RX ORDER — ROPIVACAINE HYDROCHLORIDE 2 MG/ML
INJECTION, SOLUTION EPIDURAL; INFILTRATION; PERINEURAL AS NEEDED
Status: DISCONTINUED | OUTPATIENT
Start: 2017-10-25 | End: 2017-10-25 | Stop reason: SURG

## 2017-10-25 RX ORDER — ONDANSETRON 2 MG/ML
4 INJECTION INTRAMUSCULAR; INTRAVENOUS EVERY 6 HOURS PRN
Status: DISCONTINUED | OUTPATIENT
Start: 2017-10-25 | End: 2017-10-27

## 2017-10-25 RX ADMIN — BENZOCAINE AND MENTHOL: 20; .5 SPRAY TOPICAL at 13:39

## 2017-10-25 RX ADMIN — SODIUM CHLORIDE, SODIUM LACTATE, POTASSIUM CHLORIDE, AND CALCIUM CHLORIDE 125 ML/HR: .6; .31; .03; .02 INJECTION, SOLUTION INTRAVENOUS at 11:11

## 2017-10-25 RX ADMIN — LIDOCAINE HYDROCHLORIDE AND EPINEPHRINE 3 ML: 15; 5 INJECTION, SOLUTION EPIDURAL at 01:17

## 2017-10-25 RX ADMIN — PROMETHAZINE HYDROCHLORIDE 25 MG: 25 INJECTION INTRAMUSCULAR; INTRAVENOUS at 10:07

## 2017-10-25 RX ADMIN — SODIUM CHLORIDE 2.5 MILLION UNITS: 9 INJECTION, SOLUTION INTRAVENOUS at 03:22

## 2017-10-25 RX ADMIN — ONDANSETRON 4 MG: 2 INJECTION INTRAMUSCULAR; INTRAVENOUS at 05:32

## 2017-10-25 RX ADMIN — ROPIVACAINE HYDROCHLORIDE: 2 INJECTION, SOLUTION EPIDURAL; INFILTRATION at 01:26

## 2017-10-25 RX ADMIN — ROPIVACAINE HYDROCHLORIDE: 2 INJECTION, SOLUTION EPIDURAL; INFILTRATION at 09:50

## 2017-10-25 RX ADMIN — IBUPROFEN 600 MG: 600 TABLET ORAL at 20:11

## 2017-10-25 RX ADMIN — Medication 1 TABLET: at 09:16

## 2017-10-25 RX ADMIN — SODIUM CHLORIDE 2.5 MILLION UNITS: 9 INJECTION, SOLUTION INTRAVENOUS at 07:34

## 2017-10-25 RX ADMIN — ROPIVACAINE HYDROCHLORIDE 5 ML: 2 INJECTION, SOLUTION EPIDURAL; INFILTRATION at 01:19

## 2017-10-25 RX ADMIN — LIDOCAINE HYDROCHLORIDE 3 ML: 10 INJECTION, SOLUTION INFILTRATION; PERINEURAL at 01:11

## 2017-10-25 RX ADMIN — SODIUM CHLORIDE 2.5 MILLION UNITS: 9 INJECTION, SOLUTION INTRAVENOUS at 11:29

## 2017-10-25 RX ADMIN — METOCLOPRAMIDE 10 MG: 5 INJECTION, SOLUTION INTRAMUSCULAR; INTRAVENOUS at 06:11

## 2017-10-25 NOTE — ANESTHESIA PROCEDURE NOTES
Epidural Block    Patient location during procedure: OB  Start time: 10/25/2017 1:18 AM  Reason for block: at surgeon's request and primary anesthetic  Staffing  Anesthesiologist: Heladio Ponce  Performed: anesthesiologist   Preanesthetic Checklist  Completed: patient identified, surgical consent, pre-op evaluation, timeout performed, IV checked, risks and benefits discussed and monitors and equipment checked  Epidural  Patient position: sitting  Prep: Betadine  Patient monitoring: frequent blood pressure checks, continuous pulse ox and heart rate  Approach: midline  Location: lumbar (1-5)  Injection technique: FREDRICK air  Needle  Needle type: Tuohy   Needle gauge: 18 G  Catheter type: side hole  Catheter size: 20 G  Catheter at skin depth: 10 cm  Test dose: negative and lidocaine 1 5% with epinephrine 1-to-200,000negative aspiration for CSF, negative aspiration for heme and no paresthesia on injection  patient tolerated the procedure well with no immediate complications  Additional Notes  Sterile prep and drape in accordance with protocol

## 2017-10-25 NOTE — OB LABOR/OXYTOCIN SAFETY PROGRESS
Oxytocin Safety Progress Check Note - Adelita Marte 32 y o  female MRN: [id number]    Unit/Bed#: -01 Encounter: [id number]    Obstetric History       T0      L0     SAB0   TAB0   Ectopic0   Multiple0   Live Births0      Gestational Age: 39w2d  Dose (fabio-units/min) Oxytocin: 4 fabio-units/min  Contraction Frequency (minutes): 1 5-3  Contraction Quality: Moderate  Tachysystole: No   Dilation: 9        Effacement (%): 100  Station: 1  Baseline Rate: 145 bpm  Fetal Heart Rate: 140 BPM  FHR Category: Category I     Oxytocin Safety Progress Check: Safety check completed    Notes/comments:   Decrease pitocin to 4milliunits/min due to fetal tachycardia,  No fever at this time  Continue to monitor            Arnulfo Bernal MD 10/25/2017 7:34 AM

## 2017-10-25 NOTE — L&D DELIVERY NOTE
DELIVERY NOTE  Christophe Avilez 32 y o  female MRN: [id number]  Unit/Bed#: -01 Encounter: [id number]      Obstetrician:  Dr Elsa Hall  Assistant: None  Pre-Delivery Diagnosis: 33 yo G2P 0 0 1 0 @39w2d, Induction due to CAT 2 fetal heart tracing at 44 w    Post-Delivery Diagnosis: same  Procedure:   Indications for instrumental delivery: none  Estimated Blood Loss: 141WG  Complications:  none  Specimens: Cord, blood, gasses  Description of Delivery: Patient delivered a viable female   Weighing 6lbs 11 oz  Apgars 9 and 9    With the assistance of maternal expulsive efforts and downward traction of fetal head, the anterior shoulder was delivered  The same manner was applied for the posterior shoulder but with upward traction  After delivery of the , the umbilical cord was doubly clamped and cut and the  was passed off to  staff for routine care  Umbilical cord blood and umbilical artery and venous gases were collected  Placenta was delivered with fundal massage and gentle traction on the cord  Placenta delivered intact with a central 3-vessel cord  Active management of the third stage of labor was undertaken with IV pitocin  Bleeding was noted to be under control  Inspection of the perineum, vagina, labia, and urethra revealed no laceration   Mother and baby are currently recovering nicely in stable condition  All needles, instruments, sponges were accounted for x 2     Dr Elsa Hall was present for the entire delivery

## 2017-10-25 NOTE — PROGRESS NOTES
Dr Reji Goncalves aware of fetal tachycardia and not concerned due to good variability and no maternal fever  Will continue to monitor

## 2017-10-25 NOTE — PROGRESS NOTES
Dr Torres Domingo aware of fetal tachycardia and not concerned due to good variability and no maternal fever  Will continue to monitor

## 2017-10-25 NOTE — ANESTHESIA PREPROCEDURE EVALUATION
Review of Systems/Medical History  Patient summary reviewed  Chart reviewed      Cardiovascular  Negative cardio ROS Exercise tolerance: good,     Pulmonary  Asthma: , ,        GI/Hepatic  Negative GI/hepatic ROS          Negative  ROS        Endo/Other  Negative endo/other ROS      GYN  Currently pregnant , Prior pregnancy/OB history : 2 Parity: 0,          Hematology  Negative hematology ROS      Musculoskeletal  Negative musculoskeletal ROS        Neurology    Headaches,    Psychology           Physical Exam    Airway    Mallampati score: II  TM Distance: >3 FB  Neck ROM: full     Dental   No notable dental hx     Cardiovascular  Comment: Negative ROS, Cardiovascular exam normal    Pulmonary  Pulmonary exam normal     Other Findings      Lab Results   Component Value Date    WBC 10 95 (H) 10/24/2017    HGB 12 0 10/24/2017    HCT 36 7 10/24/2017    MCV 86 10/24/2017     10/24/2017     Lab Results   Component Value Date    GLUCOSE 81 2017    CALCIUM 8 8 2017     (L) 2017    K 3 4 (L) 2017    CO2 22 2017     2017    BUN 4 (L) 2017    CREATININE 0 38 (L) 2017     No results found for: INR, PROTIME  No results found for: PTT  Type and Screen:  A        Anesthesia Plan  ASA Score- 2       Anesthesia Type- epidural with ASA Monitors  Additional Monitors:   Airway Plan:     Comment: Patient requesting epidural for labor  Patient seen and examined  Risks/benefits of Epidural anesthesia discussed including risk of PDPH, partial or failed block, and rare emergencies including infection, nerve injury and total spinal anesthesia  Anesthetic plan for potential c/s reviewed with patient          Induction-       Informed Consent- Anesthetic plan and risks discussed with patient

## 2017-10-25 NOTE — ANESTHESIA POSTPROCEDURE EVALUATION
Post-Op Assessment Note      CV Status:  Stable    Mental Status:  Alert and awake    Hydration Status:  Stable    PONV Controlled:  None    Airway Patency:  Patent    Post Op Vitals Reviewed: Yes          Staff: Anesthesiologist     Post-op block assessment: adhesive bandage applied, site cleaned, no complications and catheter intact        BP      Temp      Pulse     Resp      SpO2

## 2017-10-25 NOTE — OB LABOR/OXYTOCIN SAFETY PROGRESS
Oxytocin Safety Progress Check Note - Kusum Vasquez 32 y o  female MRN: [id number]    Unit/Bed#: -01 Encounter: [id number]    Obstetric History       T0      L0     SAB0   TAB0   Ectopic0   Multiple0   Live Births0      Gestational Age: 39w2d  Dose (fabio-units/min) Oxytocin: 8 fabio-units/min  Contraction Frequency (minutes): 2-4  Contraction Quality: Moderate  Tachysystole: No   Dilation: 10  Dilation Complete Date: 10/25/17  Dilation Complete Time: 1135  Effacement (%): 100  Station: 2  Baseline Rate: 125 bpm  Fetal Heart Rate: 140 BPM  FHR Category: Category I     Oxytocin Safety Progress Check: Safety check completed    Notes/comments:  Patient feeling pressure and wants to push  Given exam above and patient's level of discomfort we will start pushing  FHR tracings shows no sign of hypoxia at this moment  Dr Capone Rom aware of her patient's status      Emilio Rosales MD 10/25/2017 11:40 AM

## 2017-10-25 NOTE — OB LABOR/OXYTOCIN SAFETY PROGRESS
Oxytocin Safety Progress Check Note - Abena Knight 32 y o  female MRN: [id number]    Unit/Bed#: -01 Encounter: [id number]    Obstetric History       T0      L0     SAB0   TAB0   Ectopic0   Multiple0   Live Births0      Gestational Age: 39w2d  Dose (fabio-units/min) Oxytocin: 6 fabio-units/min  Contraction Frequency (minutes): 2-4  Contraction Quality: Moderate  Tachysystole: No   Dilation: 6        Effacement (%): 80  Station: -1  Baseline Rate: 115 bpm  Fetal Heart Rate: 120 BPM  FHR Category: Category I     Oxytocin Safety Progress Check: Safety check completed    Notes/comments:      AROM clear fluid  FHT- remain category 1  Continue to titrate pitocin to keep contractions 2-3 minutes apart  Comfortable with epidural       Myrtle Mcfadden MD 10/25/2017 2:14 AM

## 2017-10-25 NOTE — OB LABOR/OXYTOCIN SAFETY PROGRESS
Oxytocin Safety Progress Check Note - Murray Austin 32 y o  female MRN: [id number]    Unit/Bed#: -01 Encounter: [id number]    Obstetric History       T0      L0     SAB0   TAB0   Ectopic0   Multiple0   Live Births0      Gestational Age: 39w2d  Dose (fabio-units/min) Oxytocin: 4 fabio-units/min  Contraction Frequency (minutes): 2-4  Contraction Quality: Mild  Tachysystole: No   Dilation: 6        Effacement (%): 80  Station: -2  Baseline Rate: 115 bpm  Fetal Heart Rate: 120 BPM  FHR Category: Category I     Oxytocin Safety Progress Check: Safety check completed    Notes/comments: patient feeling 6-7/10 ctx, appears comfortable in bed  Limited change from last exam  Pit was held at 4 previously, will continue to titrate  Pt requesting pain management, discussed options of continued IV medications vs epidural  Patient opted for epidural anesthesia after our discussion        D/w Dr Rg Flores MD 10/25/2017 12:09 AM

## 2017-10-25 NOTE — PROGRESS NOTES
Dr Arpita Kraft aware of fetal tachycardia and not concerned due to good variability and no maternal fever  Will continue to monitor

## 2017-10-25 NOTE — OB LABOR/OXYTOCIN SAFETY PROGRESS
Oxytocin Safety Progress Check Note - Mary Lou Astudillo 32 y o  female MRN: [id number]    Unit/Bed#: -01 Encounter: [id number]    Obstetric History       T0      L0     SAB0   TAB0   Ectopic0   Multiple0   Live Births0      Gestational Age: 39w2d  Dose (fabio-units/min) Oxytocin: 6 fabio-units/min  Contraction Frequency (minutes): 3-4  Contraction Quality: Moderate  Tachysystole: No   Dilation: 7        Effacement (%): 90  Station: -1  Baseline Rate: 115 bpm  Fetal Heart Rate: 120 BPM  FHR Category: Category I     Oxytocin Safety Progress Check: Safety check completed    Notes/comments:     Continue management              Harper Councilman, MD 10/25/2017 5:13 AM

## 2017-10-25 NOTE — OB LABOR/OXYTOCIN SAFETY PROGRESS
Oxytocin Safety Progress Check Note - Alexa Sanchez 32 y o  female MRN: [id number]    Unit/Bed#: -01 Encounter: [id number]    Obstetric History       T0      L0     SAB0   TAB0   Ectopic0   Multiple0   Live Births0      Gestational Age: 39w1d  Dose (fabio-units/min) Oxytocin: 4 fabio-units/min  Contraction Frequency (minutes): 1-3  Contraction Quality: Mild  Tachysystole: No   Dilation: 5-6        Effacement (%): 80  Station: -2  Baseline Rate: 120 bpm  Fetal Heart Rate: 120 BPM  FHR Category: Category I          Notes/comments:      Pt comfortable  Continuing to make cervical change  Category 1 strip  Recheck at midnight      D/w Dr Chanell Griffin DO 10/24/2017 10:12 PM

## 2017-10-26 PROCEDURE — 90686 IIV4 VACC NO PRSV 0.5 ML IM: CPT | Performed by: OBSTETRICS & GYNECOLOGY

## 2017-10-26 RX ORDER — DOCUSATE SODIUM 100 MG/1
100 CAPSULE, LIQUID FILLED ORAL 2 TIMES DAILY
Status: DISCONTINUED | OUTPATIENT
Start: 2017-10-26 | End: 2017-10-27 | Stop reason: HOSPADM

## 2017-10-26 RX ADMIN — OXYCODONE HYDROCHLORIDE AND ACETAMINOPHEN 1 TABLET: 5; 325 TABLET ORAL at 00:03

## 2017-10-26 RX ADMIN — IBUPROFEN 600 MG: 600 TABLET ORAL at 07:59

## 2017-10-26 RX ADMIN — INFLUENZA VIRUS VACCINE 0.5 ML: 15; 15; 15; 15 SUSPENSION INTRAMUSCULAR at 12:45

## 2017-10-26 RX ADMIN — DOCUSATE SODIUM 100 MG: 100 CAPSULE, LIQUID FILLED ORAL at 12:44

## 2017-10-26 RX ADMIN — OXYCODONE HYDROCHLORIDE AND ACETAMINOPHEN 1 TABLET: 5; 325 TABLET ORAL at 16:22

## 2017-10-26 RX ADMIN — OXYCODONE HYDROCHLORIDE AND ACETAMINOPHEN 1 TABLET: 5; 325 TABLET ORAL at 23:54

## 2017-10-26 RX ADMIN — Medication 1 TABLET: at 12:45

## 2017-10-26 RX ADMIN — DOCUSATE SODIUM 100 MG: 100 CAPSULE, LIQUID FILLED ORAL at 18:13

## 2017-10-26 NOTE — SOCIAL WORK
Breast pump consult  Per pt request, Medela pump ordered from [facility] via [facility] for d/c tomorrow  No other CM needs noted

## 2017-10-26 NOTE — PROGRESS NOTES
Date: 10/26/17  Procedure: Spontaneous Vaginal Delivery  Postpartum Day #: 1     SUBJECTIVE:  Pain: yes  Tolerating Oral Intake: yes   Voiding: yes  Flatus: no  Bowel Movement: no  Ambulating: yes  Breastfeeding: yes  Chest Pain: no  Shortness of Breath: no  Leg Pain/Discomfort: no  Lochia: moderate      OBJECTIVE:   Vitals: Temp:  [97 8 °F (36 6 °C)-99 6 °F (37 6 °C)] 97 8 °F (36 6 °C)  HR:  [] 58  Resp:  [16-18] 16  BP: ()/(56-86) 113/63  General: No Acute Distress   Cardiovascular: Regular, Rate and Rhythm, no murmur, normal S1/S2   Lungs: Clear to Auscultation Bilaterally, no wheezing, non-labored breathing   Abdomen: Soft, non-distended, non-tender, no rebound, guarding  Fundus: Firm & Non-Tender, Fundal Location: at the umbilicus  Lower Extremities: Non-tender  Other:    LABS / TESTS / MEDICATION:    Recent Results (from the past 72 hour(s))   CBC and differential    Collection Time: 10/24/17  2:39 PM   Result Value Ref Range    WBC 10 95 (H) 4 31 - 10 16 Thousand/uL    RBC 4 28 3 81 - 5 12 Million/uL    Hemoglobin 12 0 11 5 - 15 4 g/dL    Hematocrit 36 7 34 8 - 46 1 %    MCV 86 82 - 98 fL    MCH 28 0 26 8 - 34 3 pg    MCHC 32 7 31 4 - 37 4 g/dL    RDW 14 6 11 6 - 15 1 %    MPV 13 0 (H) 8 9 - 12 7 fL    Platelets 064 087 - 393 Thousands/uL    nRBC 0 /100 WBCs    Neutrophils Relative 74 43 - 75 %    Lymphocytes Relative 21 14 - 44 %    Monocytes Relative 5 4 - 12 %    Eosinophils Relative 0 0 - 6 %    Basophils Relative 0 0 - 1 %    Neutrophils Absolute 8 00 (H) 1 85 - 7 62 Thousands/µL    Lymphocytes Absolute 2 29 0 60 - 4 47 Thousands/µL    Monocytes Absolute 0 59 0 17 - 1 22 Thousand/µL    Eosinophils Absolute 0 03 0 00 - 0 61 Thousand/µL    Basophils Absolute 0 02 0 00 - 0 10 Thousands/µL   Type and screen    Collection Time: 10/24/17  2:39 PM   Result Value Ref Range    ABO Grouping A     Rh Factor Positive     Antibody Screen Negative     Specimen Expiration Date [id number]    RPR    Collection Time: 10/24/17  2:39 PM   Result Value Ref Range    RPR Non-Reactive Non-Reactive   Blood gas, arterial, cord    Collection Time: 10/25/17 12:17 PM   Result Value Ref Range    pH, Cord Art 7 305 7 230 - 7 430    pCO2, Cord Art 51 8 30 0 - 60 0    pO2, Cord Art 20 5 5 0 - 25 0 mm HG    HCO3, Cord Art 25 2 17 3 - 27 3 mmol/L    Base Exc, Cord Art -1 9 (L) 3 0 - 11 0 mmol/L    O2 Content, Cord Art 9 5 ml/dl    O2 Hgb, Arterial Cord 44 6 %   Blood gas, venous, cord    Collection Time: 10/25/17 12:17 PM   Result Value Ref Range    pH, Cord Harry 7 384 7 190 - 7 490    pCO2, Cord Harry 38 6 27 0 - 43 0 mm HG    pO2, Cord Harry 24 1 15 0 - 45 0 mm HG    HCO3, Cord Harry 22 5 12 2 - 28 6 mmol/L    Base Exc, Cord Harry -2 2 (L) 1 0 - 9 0 mmol/L    O2 Cont, Cord Harry 12 4 mL/dL    O2 HGB,VENOUS CORD 59 9 %         measles-mumps-rubella 0 5 mL Subcutaneous Once   prenatal multivitamin 1 tablet Oral Daily       acetaminophen 650 mg Q4H PRN   albuterol 2 puff Q6H PRN   benzocaine-menthol-lanolin-aloe  4x Daily PRN   calcium carbonate 1,000 mg TID PRN   hydrocortisone 1 application PRN   ibuprofen 600 mg Q6H PRN   ondansetron 4 mg Q6H PRN   oxyCODONE-acetaminophen 1 tablet Q4H PRN   oxyCODONE-acetaminophen 2 tablet Q4H PRN   simethicone 80 mg 4x Daily PRN   tetanus-diphtheria-acellular pertussis 0 5 mL PRN   witch hazel-glycerin 1 pad PRN       ASSESSMENT AND PLAN:   32 y o   s/p  Postpartum day  1  1) Delivery: Continue routine postpartum care, encourage ambulation, advance diet as tolerated    Signature / Title: Lucy Vance MD  Date: 10/26/2017  Time: 6:22 AM

## 2017-10-26 NOTE — LACTATION NOTE
This note was copied from a baby's chart  Mom states infant did not really latch on and she has decided to just pump and feed by bottle  Is already giving formula  Given printed info on pace bottle feeding  Will start pumping as soon as able

## 2017-10-27 VITALS
SYSTOLIC BLOOD PRESSURE: 140 MMHG | HEIGHT: 61 IN | WEIGHT: 150 LBS | HEART RATE: 64 BPM | DIASTOLIC BLOOD PRESSURE: 82 MMHG | TEMPERATURE: 98.2 F | RESPIRATION RATE: 20 BRPM | BODY MASS INDEX: 28.32 KG/M2

## 2017-10-27 PROCEDURE — 90707 MMR VACCINE SC: CPT | Performed by: OBSTETRICS & GYNECOLOGY

## 2017-10-27 RX ORDER — IBUPROFEN 600 MG/1
600 TABLET ORAL EVERY 6 HOURS PRN
Qty: 30 TABLET | Refills: 0 | Status: SHIPPED | OUTPATIENT
Start: 2017-10-27 | End: 2018-01-26 | Stop reason: ALTCHOICE

## 2017-10-27 RX ORDER — ACETAMINOPHEN 325 MG/1
650 TABLET ORAL EVERY 4 HOURS PRN
Qty: 30 TABLET | Refills: 0 | Status: SHIPPED | OUTPATIENT
Start: 2017-10-27 | End: 2018-01-26 | Stop reason: ALTCHOICE

## 2017-10-27 RX ORDER — DIAPER,BRIEF,INFANT-TODD,DISP
1 EACH MISCELLANEOUS AS NEEDED
Qty: 30 G | Refills: 0 | Status: SHIPPED | OUTPATIENT
Start: 2017-10-27 | End: 2018-01-26 | Stop reason: ALTCHOICE

## 2017-10-27 RX ORDER — DOCUSATE SODIUM 100 MG/1
100 CAPSULE, LIQUID FILLED ORAL 2 TIMES DAILY
Qty: 10 CAPSULE | Refills: 0 | Status: SHIPPED | OUTPATIENT
Start: 2017-10-27 | End: 2018-01-26 | Stop reason: ALTCHOICE

## 2017-10-27 RX ADMIN — IBUPROFEN 600 MG: 600 TABLET ORAL at 07:42

## 2017-10-27 RX ADMIN — Medication 1 TABLET: at 09:27

## 2017-10-27 RX ADMIN — DOCUSATE SODIUM 100 MG: 100 CAPSULE, LIQUID FILLED ORAL at 07:42

## 2017-10-27 RX ADMIN — MEASLES, MUMPS, AND RUBELLA VIRUS VACCINE LIVE 0.5 ML: 1000; 12500; 1000 INJECTION, POWDER, LYOPHILIZED, FOR SUSPENSION SUBCUTANEOUS at 09:24

## 2017-10-27 RX ADMIN — BENZOCAINE AND MENTHOL: 20; .5 SPRAY TOPICAL at 09:42

## 2017-10-27 NOTE — DISCHARGE INSTRUCTIONS
Vaginal Delivery   WHAT YOU SHOULD KNOW:   A vaginal delivery is the birth of your baby through your vagina (birth canal)  AFTER YOU LEAVE:   Medicines:  · NSAIDs  help decrease swelling and pain or fever  This medicine is available with or without a doctor's order  NSAIDs can cause stomach bleeding or kidney problems in certain people  If you take blood thinner medicine, always ask your healthcare provider if NSAIDs are safe for you  Always read the medicine label and follow directions  · Take your medicine as directed  Call your healthcare provider if you think your medicine is not helping or if you have side effects  Tell him if you are allergic to any medicine  Keep a list of the medicines, vitamins, and herbs you take  Include the amounts, and when and why you take them  Bring the list or the pill bottles to follow-up visits  Carry your medicine list with you in case of an emergency  Follow up with your primary healthcare provider:  Most women need to return 6 weeks after a vaginal delivery  Ask about how to care for your wounds or stitches  Write down your questions so you remember to ask them during your visits  Activity:  Rest as much as possible  Try to keep all activities short  You may be able to do some exercise soon after you have your baby  Talk with your primary healthcare provider before you start exercising  If you work outside the home, ask when you can return to your job  Kegel exercises:  Kegel exercises may help your vaginal and rectal muscles heal faster  You can do Kegel exercises by tightening and relaxing the muscles around your vagina  Kegel exercises help make the muscles stronger  Breast care:  When your milk comes in, your breasts may feel full and hard  Ask how to care for your breasts, even if you are not breastfeeding  Constipation:  Do not try to push the bowel movement out if it is too hard   High-fiber foods, extra liquids, and regular exercise can help you prevent constipation  Examples of high-fiber foods are fruit and bran  Prune juice and water are good liquids to drink  Regular exercise helps your digestive system work  You may also be told to take over-the-counter fiber and stool softener medicines  Take these items as directed  Hemorrhoids:  Pregnancy can cause severe hemorrhoids  You may have rectal pain because of the hemorrhoids  Ask how to prevent or treat hemorrhoids  Perineum care: Your perineum is the area between your vagina and anus  Keep the area clean and dry to help it heal and to prevent infection  Wash the area gently with soap and water when you bathe or shower  Rinse your perineum with warm water when you use the toilet  Your primary healthcare provider may suggest you use a warm sitz bath to help decrease pain  A sitz bath is a bathtub or basin filled to hip level  Stay in the sitz bath for 20 to 30 minutes, or as directed  Vaginal discharge: You will have vaginal discharge, called lochia, after your delivery  The lochia is bright red the first day or two after the birth  By the fourth day, the amount decreases, and it turns red-brown  Use a sanitary pad rather than a tampon to prevent a vaginal infection  It is normal to have lochia up to 8 weeks after your baby is born  Monthly periods: Your period may start again within 7 to 12 weeks after your baby is born  If you are breastfeeding, it may take longer for your period to start again  You can still get pregnant again even though you do not have your monthly period  Talk with your primary healthcare provider about a birth control method that will be good for you if you do not want to get pregnant  Mood changes: Many new mothers have some kind of mood changes after delivery  Some of these changes occur because of lack of sleep, hormone changes, and caring for a new baby  Some mood changes can be more serious, such as postpartum depression   Talk with your primary healthcare provider if you feel unable to care for yourself or your baby  Sexual activity:  You may need to avoid sex for 6 to 7 weeks after you have your baby  You may notice you have a decreased desire for sex, or sex may be painful  You may need to use a vaginal lubricant (gel) to help make sex more comfortable  Contact your primary healthcare provider if:   · You have heavy vaginal bleeding that fills 1 or more sanitary pads in 1 hour  · You have a fever  · Your pain does not go away, or gets worse  · The skin between your vagina and rectum is swollen, warm, or red  · You have swollen, hard, or painful breasts  · You feel very sad or depressed  · You feel more tired than usual      · You have questions or concerns about your condition or care  Seek care immediately or call 911 if:   · You have pus or yellow drainage coming from your vagina or wound  · You are urinating very little, or not at all  · Your arm or leg feels warm, tender, and painful  It may look swollen and red  · You feel lightheaded, have sudden and worsening chest pain, or trouble breathing  You may have more pain when you take deep breaths or cough, or you may cough up blood  © 2014 [address] is for End User's use only and may not be sold, redistributed or otherwise used for commercial purposes  All illustrations and images included in CareNotes® are the copyrighted property of [employer]  or [employer]  The above information is an  only  It is not intended as medical advice for individual conditions or treatments  Talk to your doctor, nurse or pharmacist before following any medical regimen to see if it is safe and effective for you

## 2017-10-27 NOTE — DISCHARGE SUMMARY
Discharge Summary - OB/GYN   Alexa Sanchez 32 y o  female MRN: [id number]  Unit/Bed#: -01 Encounter: [id number]      Admission Date: 10/24/2017     Discharge Date: 10/27/2017    Admitting Diagnosis:   1  Pregnancy at 39w2d    Discharge Diagnosis:   Same, delivered    Procedures: spontaneous vaginal delivery    Attending: Dr Willy German Course:     Alexa Sanchez is a 32 y o   at 39w2d wks who was initially admitted for elective induction of labor due to Cat 2 fetal heart tracings  She delivered a viable female  on 10/25/2017  Weight 6 lbs 11 oz via spontaneous vaginal delivery  Apgars were 9 (1 min) and 9 (5 min)   was transferred to  nursery  Patient tolerated the procedure well and was transferred to recovery in stable condition  On day of discharge, she was ambulating and able to reasonably perform all ADLs  She was voiding and had appropriate bowel function  Pain was well controlled  She was discharged home on post-partum day # 2 without complications  Patient was instructed to follow up with her OB as an outpatient and was given appropriate warnings to call provider if she develops signs of infection or uncontrolled pain  Complications: none apparent    Condition at discharge: stable     Discharge instructions/Information to patient and family:   See after visit summary for information provided to patient and family  Provisions for Follow-Up Care:  See after visit summary for information related to follow-up care and any pertinent home health orders  Disposition: Home    Planned Readmission: No    Discharge Medications: For a complete list of the patient's medications, please refer to her med rec

## 2017-10-27 NOTE — PROGRESS NOTES
Date: 10/27/17  Procedure: Spontaneous Vaginal Delivery  Postpartum #: 2     SUBJECTIVE:  Pain: no  Tolerating Oral Intake: yes   Voiding: yes  Flatus: yes  Bowel Movement: no  Ambulating: yes  Breastfeeding: will be pumping, formula feeding  Chest Pain: no  Shortness of Breath: no  Leg Pain/Discomfort: no  Lochia: moderate      OBJECTIVE:   Vitals: Temp:  [97 8 °F (36 6 °C)-98 4 °F (36 9 °C)] 98 4 °F (36 9 °C)  HR:  [57-73] 65  Resp:  [16-18] 18  BP: (109-121)/(64-78) 121/78  General: No Acute Distress   Cardiovascular: Regular, Rate and Rhythm, no murmur, normal S1/S2   Lungs: Clear to Auscultation Bilaterally, no wheezing, non-labored breathing   Abdomen: Soft, non-distended, non-tender, no rebound, guarding  Fundus: Firm & Non-Tender, Fundal Location: -1 cm below the umbilicus  Lower Extremities: Non-tender  Other:    LABS / TESTS / MEDICATION:    Recent Results (from the past 72 hour(s))   CBC and differential    Collection Time: 10/24/17  2:39 PM   Result Value Ref Range    WBC 10 95 (H) 4 31 - 10 16 Thousand/uL    RBC 4 28 3 81 - 5 12 Million/uL    Hemoglobin 12 0 11 5 - 15 4 g/dL    Hematocrit 36 7 34 8 - 46 1 %    MCV 86 82 - 98 fL    MCH 28 0 26 8 - 34 3 pg    MCHC 32 7 31 4 - 37 4 g/dL    RDW 14 6 11 6 - 15 1 %    MPV 13 0 (H) 8 9 - 12 7 fL    Platelets 723 821 - 474 Thousands/uL    nRBC 0 /100 WBCs    Neutrophils Relative 74 43 - 75 %    Lymphocytes Relative 21 14 - 44 %    Monocytes Relative 5 4 - 12 %    Eosinophils Relative 0 0 - 6 %    Basophils Relative 0 0 - 1 %    Neutrophils Absolute 8 00 (H) 1 85 - 7 62 Thousands/µL    Lymphocytes Absolute 2 29 0 60 - 4 47 Thousands/µL    Monocytes Absolute 0 59 0 17 - 1 22 Thousand/µL    Eosinophils Absolute 0 03 0 00 - 0 61 Thousand/µL    Basophils Absolute 0 02 0 00 - 0 10 Thousands/µL   Type and screen    Collection Time: 10/24/17  2:39 PM   Result Value Ref Range    ABO Grouping A     Rh Factor Positive     Antibody Screen Negative     Specimen Expiration Date [id number]    RPR    Collection Time: 10/24/17  2:39 PM   Result Value Ref Range    RPR Non-Reactive Non-Reactive   Blood gas, arterial, cord    Collection Time: 10/25/17 12:17 PM   Result Value Ref Range    pH, Cord Art 7 305 7 230 - 7 430    pCO2, Cord Art 51 8 30 0 - 60 0    pO2, Cord Art 20 5 5 0 - 25 0 mm HG    HCO3, Cord Art 25 2 17 3 - 27 3 mmol/L    Base Exc, Cord Art -1 9 (L) 3 0 - 11 0 mmol/L    O2 Content, Cord Art 9 5 ml/dl    O2 Hgb, Arterial Cord 44 6 %   Blood gas, venous, cord    Collection Time: 10/25/17 12:17 PM   Result Value Ref Range    pH, Cord Harry 7 384 7 190 - 7 490    pCO2, Cord Harry 38 6 27 0 - 43 0 mm HG    pO2, Cord Harry 24 1 15 0 - 45 0 mm HG    HCO3, Cord Harry 22 5 12 2 - 28 6 mmol/L    Base Exc, Cord Harry -2 2 (L) 1 0 - 9 0 mmol/L    O2 Cont, Cord Harry 12 4 mL/dL    O2 HGB,VENOUS CORD 59 9 %         docusate sodium 100 mg Oral BID   measles-mumps-rubella 0 5 mL Subcutaneous Once   prenatal multivitamin 1 tablet Oral Daily       acetaminophen 650 mg Q4H PRN   albuterol 2 puff Q6H PRN   benzocaine-menthol-lanolin-aloe  4x Daily PRN   calcium carbonate 1,000 mg TID PRN   hydrocortisone 1 application PRN   ibuprofen 600 mg Q6H PRN   ondansetron 4 mg Q6H PRN   oxyCODONE-acetaminophen 1 tablet Q4H PRN   oxyCODONE-acetaminophen 2 tablet Q4H PRN   simethicone 80 mg 4x Daily PRN   tetanus-diphtheria-acellular pertussis 0 5 mL PRN   witch hazel-glycerin 1 pad PRN       ASSESSMENT AND PLAN:   32 y o   s/p  Postpartum day  2  1) Delivery: Continue routine postpartum care, encourage ambulation, pain control PRN  2) Anticipate discharge to home today    Signature / Title: Cristin Crook MD  Date: 10/27/2017  Time: 6:24 AM

## 2017-10-27 NOTE — CASE MANAGEMENT
Notification of Maternity Inpatient Admission/Maternity Inpatient Authorization Request  This is a Notification of Maternity Inpatient Admission/Maternity Inpatient Authorization Request to our facility [facility]  Please be advised that this patient is currently in our facility under Inpatient Status  Below you will find the Birth/ Summary, Attending Physician and Facilitys information including NPI# and contact for the Utilization  assigned to the [facility] where the patient is receiving services  Please feel free to contact the Utilization Review Department with any questions  Mothers Information:  Kusum Vasquez  MRN: [id number]  YOB: 1990  Admission Date: 10/24/2017 12:25 PM  Discharge Date: 10/27/2017 12:50 PM  Disposition: Home/Self Care  Admitting Diagnosis:   O80 VAGINAL DELIVERY  38 weeks gestation of pregnancy [Z3A 38]   Information:  Estimated Date of Delivery: 10/30/17  Information for the patient's :  Berniezurdo Bonifacio [[id number]]      Delivery Information:  Sex: female  Delivered 10/25/2017 12:14 PM by Vaginal, Spontaneous Delivery; Gestational Age: 44w2d    [place] Measurements:  Weight: 6 lb 11 2 oz (3040 g); Height: 19"    APGAR 1 minute 5 minutes 10 minutes   Totals: 9 9      OB History      Para Term  AB Living    2 1 1 0 1 1    SAB TAB Ectopic Multiple Live Births    1 0 0 0 1        Attending Physician:  KESHA Garcia    Specialty- Obstetrics and Gynecology  [address] [id number]  [address], [address] [facility]  Phone 1: [phone]  Phone 2: [phone]  Fax: [phone] [facility]  300 [facility], 210 [facility]  [phone]  Tax ID: [id number]  NPI: [id number]    [facility] in the [facility] by [employer] for 2017  Network Utilization Review Department  Phone: [phone]; Fax [phone]  ATTENTION: The Network Utilization Review Department is now centralized for our 7 Facilities  Make a note that we have a new phone and fax numbers for our Department  Please call with any questions or concerns to [phone] and carefully follow the prompts so that you are directed to the right person  All voicemails are confidential  Fax any determinations, approvals, denials, and requests for initial or continue stay review clinical to [phone]  Due to HIGH CALL volume, it would be easier if you could please send faxed requests to expedite your requests and in part, help us provide discharge notifications faster

## 2017-10-27 NOTE — LACTATION NOTE
Met with mother to discuss plans for pumping and feeding infant pumped milk  Encouraged mom to feed infant breast milk first based on her choice for feeding, then follow up with supplemented formula as desired  Shared with mom strategies for protecting milk supply incase she decides to go back to breast feeding with a pumping schedule  Provided information on paced bottle feeding  Provided information on storage of breast milk and cleaning of pump set up  Discussed feeding log and benefit of utilizing it to take to pediatrician office or other support for feeding  Discussed s/s of breastfeeding going well and s/s of support and caution for feeding support

## 2017-11-02 LAB — PLACENTA IN STORAGE: NORMAL

## 2017-11-10 ENCOUNTER — GENERIC CONVERSION - ENCOUNTER (OUTPATIENT)
Dept: OTHER | Facility: OTHER | Age: 27
End: 2017-11-10

## 2017-11-30 ENCOUNTER — ALLSCRIPTS OFFICE VISIT (OUTPATIENT)
Dept: OTHER | Facility: OTHER | Age: 27
End: 2017-11-30

## 2017-12-05 NOTE — PROGRESS NOTES
Plan  Health Maintenance    · NuvaRing 0 12-0 015 MG/24HR Vaginal Ring; INSERT 1 RING VAGINALLY FOR 3  WEEKS THEN 1 WEEK OFF   Rx By: Sy Luna; Dispense: 27 Days ; #:1 Ring; Refill: 11; For: Health Maintenance; JAYE = N; Verified Transmission to [facility][id number] Last Updated By: SystemFoodista; 2017 11:46:38 AM  Migraine    · Butalbital-APAP-Caffeine -40 MG Oral Capsule (Fioricet); 1 TAB PO EVERY 6  HRS PRN HEADACHE MDD:6   Rx By: Sy Luna; Dispense: 0 Days ; #:30 Capsule; Refill: 0; For: Migraine; JAYE = N; Print Rx    Discussion/Summary  Discussion Summary:   Postpartum visit without abnormal findings  Recommended alternative form of birth control to the NuvaRing  Discussed IUD And Depo-Provera, patient declined both  Rx Nuvaring and Fioricet  Chief Complaint  Chief Complaint Free Text Note Form: Patient here for her post partum visit  History of Present Illness  Postpartum Follow-Up: The patient is being seen for postpartum follow up  The patient is status post vaginal delivery  Delivery date was 10/25/2017  The baby is a girl  The baby's name is Keyur Figueroa  Birth weight was grams, 6 lbs, 11 oz  APGAR scores were 9 at one minute after birth and 9 at five minutes after birth  The patient did not have any  complications  The baby is currently living at home  The baby is breastfeeding and bottle feeding  The patient is currently asymptomatic  No associated symptoms are reported  HPI: Patient is a 80-year-old female, para one here for a postpartum visit  She'll uncomplicated vaginal delivery with no lacerations  Patient is switched to bottle feeding  Patient has lots of support at home from her mother in-laws and   Urine pregnancy test today is negative  Review of systems is negative for heavy vaginal bleeding, pelvic pain or fevers  Negative for postpartum blues  Patient requests a refill on her Fioricet        Review of Systems   Female ROS: no pelvic pain  Focused-Female:   Constitutional: no fever and not feeling poorly  Breasts: no complaints of breast pain, breast lump or nipple discharge  Active Problems    1  Abdominal pain (789 00) (R10 9)   2  Asthma (493 90) (J45 909)   3  Dizziness (780 4) (R42)   4  Encounter for supervision of other normal pregnancy in third trimester (V22 1) (Z34 83)   5  Encounter for supervision of other normal pregnancy, third trimester (V22 1) (Z34 83)   6  GBS (group B Streptococcus carrier), +RV culture, currently pregnant (V23 89,V02 51)   (O99 820)   7  Lightheaded (780 4) (R42)   8  Low grade squamous intraepithelial dysplasia affecting pregnancy in second trimester,   antepartum (922 96,568 1) (O99 89,R89 6)   9  Lower back pain (724 2) (M54 5)   10  Migraine (346 90) (G43 909)   11  Mild cervical dysplasia (622 11) (N87 0)   12  Nausea and vomiting of pregnancy, antepartum (643 93) (O21 9)   13  Pregnancy (V22 2) (Z34 90)   14  Shortness of breath (786 05) (R06 02)    Past Medical History    1  History of , spontaneous complete (634 92) (O03 9)   2  History of Anxiety and depression (300 4) (F41 8)   3  Asthma (493 90) (J45 909)   4  History of  2   5  History of endometriosis (V13 29) (Z87 42)   6  History of methicillin resistant Staphylococcus aureus infection (V12 04) (Z86 14)   7  History of varicella (V12 09) (Z86 19)   8  History of Migraine (346 90) (G43 909)   9  History of Recurrent canker sores (528 2) (K12 0)    Surgical History    1  History of Colposcopy Cervix With Biopsy(S)   2  History of Exploratory Laparoscopy   3  History of Tonsillectomy    Family History  Mother    1  Family history of hyperlipidemia (V18 19) (Z83 49)   2  Family history of hypertension (V17 49) (Z82 49)   3  Denied: Family history of mental disorder   4  Family history of osteoporosis (V17 81) (Z82 62)   5  Family history of Hypertension   6  Family history of Migraines  Father    9   Family history of Anxiety and depression   8  Denied: Family history of mental disorder  Sister    5  Family history of asthma (V17 5) (Z82 5)  Brother    8  Family history of asthma (V17 5) (Z82 5)   11  Family history of hypertension (V17 49) (Z82 49)  Maternal Grandmother    12  Family history of Arthritis   13  Family history of hyperlipidemia (V18 19) (Z83 49)   14  Family history of hypertension (V17 49) (Z82 49)   15  Denied: Family history of mental disorder   16  Family history of osteoporosis (V17 81) (Z82 62)   17  Family history of Osteoporosis    Social History    · Denied: History of Alcohol use   · Always uses seat belt   · Caffeine use (V49 89) (F15 90)   · Currently sexually active   · Denied: History of Daily caffeinated coffee consumption   · Employed   · Exercises rarely (V49 89) (Z78 9)   · Denied: History of Exercises regularly   · Lives with family   · Never a smoker   · No living will   · No Sikh beliefs   · Denied: History of Recreational drug use   · Sexually active with members of opposite gender   · Single   · Uses condoms   · Denied: History of Uses drugs daily    Current Meds   1  Butalbital-APAP-Caffeine -40 MG Oral Capsule; TAKE 2 CAPSULE Every 4 hours   PRN headache MDD:6 capsules; Therapy: 17HRU1474 to (Evaluate:09Oct2017)  Requested for: 59GFG9524; Last   Rx:03Oct2017 Ordered   2  Butalbital-APAP-Caffeine -40 MG Oral Tablet; Take 1 tablet 3 times daily as   needed; Last Rx:03Oct2017 Ordered   3  Ondansetron 4 MG Oral Tablet Disintegrating; TAKE 4 MG Every 6 hours  Requested for:   16SFL1621; Last Rx:03Oct2017 Ordered   4  Prenatal Multi +DHA 27-0 8-228 MG Oral Capsule; Take one tablet daily; Therapy: 07VZX8943 to (Evaluate:01Jan2018)  Requested for: 68FFO2527; Last   Rx:03Oct2017 Ordered   5  Ventolin  (90 Base) MCG/ACT Inhalation Aerosol Solution; INHALE 1 TO 2 PUFFS   EVERY 4 TO 6 HOURS AS NEEDED;    Therapy: 28Ddx6767 to (Evaluate:20Apr2018)  Requested for: 25Apr2017; Last   Rx:25Apr2017 Ordered    Allergies    1  No Known Drug Allergies    2  No Known Environmental Allergies   3  No Known Food Allergies    Vitals  Vital Signs    Recorded: 50AEL9490 09:15UT   Systolic 744, LUE, Sitting   Diastolic 70, LUE, Sitting   Height 5 ft 1 in   Weight 126 lb 12 8 oz   BMI Calculated 23 96   BSA Calculated 1 56     Physical Exam    Constitutional   General appearance: No acute distress, well appearing and well nourished  Pulmonary   Respiratory effort: No increased work of breathing or signs of respiratory distress  Genitourinary   External genitalia: Normal and no lesions appreciated      Psychiatric   Orientation to person, place, and time: Normal     Mood and affect: Normal        Signatures   Electronically signed by : KESHA Huynh ; Nov 30 2017 11:50AM EST                       (Author)

## 2017-12-06 ENCOUNTER — GENERIC CONVERSION - ENCOUNTER (OUTPATIENT)
Dept: OTHER | Facility: OTHER | Age: 27
End: 2017-12-06

## 2017-12-08 ENCOUNTER — LAB CONVERSION - ENCOUNTER (OUTPATIENT)
Dept: OTHER | Facility: OTHER | Age: 27
End: 2017-12-08

## 2017-12-08 ENCOUNTER — GENERIC CONVERSION - ENCOUNTER (OUTPATIENT)
Dept: OTHER | Facility: OTHER | Age: 27
End: 2017-12-08

## 2017-12-08 LAB
BASOPHILS # BLD AUTO: 0.7 %
BASOPHILS # BLD AUTO: 53 CELLS/UL (ref 0–200)
DEPRECATED RDW RBC AUTO: 13.3 % (ref 11–15)
EOSINOPHIL # BLD AUTO: 1.4 %
EOSINOPHIL # BLD AUTO: 106 CELLS/UL (ref 15–500)
HCT VFR BLD AUTO: 38.6 % (ref 35–45)
HGB BLD-MCNC: 12.2 G/DL (ref 11.7–15.5)
LYMPHOCYTES # BLD AUTO: 3602 CELLS/UL (ref 850–3900)
LYMPHOCYTES # BLD AUTO: 47.4 %
MCH RBC QN AUTO: 27.5 PG (ref 27–33)
MCHC RBC AUTO-ENTMCNC: 31.6 G/DL (ref 32–36)
MCV RBC AUTO: 86.9 FL (ref 80–100)
MONOCYTES # BLD AUTO: 342 CELLS/UL (ref 200–950)
MONOCYTES (HISTORICAL): 4.5 %
NEUTROPHILS # BLD AUTO: 3496 CELLS/UL (ref 1500–7800)
NEUTROPHILS # BLD AUTO: 46 %
PLATELET # BLD AUTO: 209 THOUSAND/UL (ref 140–400)
PMV BLD AUTO: 12.9 FL (ref 7.5–12.5)
RBC # BLD AUTO: 4.44 MILLION/UL (ref 3.8–5.1)
TSH SERPL DL<=0.05 MIU/L-ACNC: 0.93 MIU/L
WBC # BLD AUTO: 7.6 THOUSAND/UL (ref 3.8–10.8)

## 2017-12-11 ENCOUNTER — GENERIC CONVERSION - ENCOUNTER (OUTPATIENT)
Dept: OTHER | Facility: OTHER | Age: 27
End: 2017-12-11

## 2018-01-09 NOTE — MISCELLANEOUS
Message  Return to work or school:    She is able to return to work on  9/14/2017      Please excuse Kel Henderson from work due to a current medical condition in pregnancy  Dr Jc Boyce  Signatures   Electronically signed by :  Rocael Montague, ; Sep 13 2017 10:29AM EST                       (Author)

## 2018-01-09 NOTE — PROGRESS NOTES
Chief Complaint  TdaP vaccine given in R Deltoid without any difficulty - MQ  Active Problems    1  Asthma (493 90) (J45 909)   2  Encounter for supervision of other normal pregnancy in second trimester (V22 1)   (Z34 82)   3  Low grade squamous intraepithelial dysplasia affecting pregnancy in second trimester,   antepartum (258 08,787 8) (O99 89,R89 6)   4  Migraine (346 90) (G43 909)   5  Nausea and vomiting of pregnancy, antepartum (643 93) (O21 9)   6  Pregnancy (V22 2) (Z34 90)    Current Meds   1  [facility]+DHA 28-0 975 & 200 MG Oral Miscellaneous; Therapy: 65YBQ9470 to Recorded   2  Fioricet -40 MG TABS (Butalbital-APAP-Caffeine); Therapy: (Recorded:28Apr2017) to Recorded   3  Ondansetron HCl - 4 MG Oral Tablet (Zofran); TAKE 1 TABLET BY MOUTH EVERY 6   HOURS AS NEEDED FOR NAUSEA AND VOMITING  Requested for: 33Xhi1398; Last   Rx:99Qva1178 Ordered   4  PreNatal [facility] CAPS; Therapy: (Recorded:30Mar2017) to Recorded   5  Prenatal Multi +DHA 27-0 8-228 MG Oral Capsule; Take one tablet daily; Therapy: 36DCE1496 to (Evaluate:64Kuh7089)  Requested for: 15DPA5986; Last   Rx:90Wwc0874 Ordered   6  Ventolin  (90 Base) MCG/ACT Inhalation Aerosol Solution; INHALE 1 TO 2   PUFFS EVERY 4 TO 6 HOURS AS NEEDED; Therapy: 25Apr2017 to (Evaluate:20Apr2018)  Requested for: 54Uzx3230; Last   Rx:21Lsd0978 Ordered    Allergies    1  No Known Drug Allergies    2  No Known Environmental Allergies   3  No Known Food Allergies    Vitals  Signs    Systolic: 512, LUE, Sitting  Diastolic: 70, LUE, Sitting  Height: 5 ft 1 in  Weight: 138 lb   BMI Calculated: 26 08  BSA Calculated: 1 61  LMP: [id number]    Plan  Pregnancy    · Tdap (Adacel)    Future Appointments    Date/Time Provider Specialty Site   10/03/2017 04:40 PM KESHA Gunn  Obstetrics/Gynecology [facility] OB   10/13/2017 10:20 AM KESHA Guevara   Obstetrics/Gynecology [facility] OB   10/30/2017 08:40 AM Thang Piña, [facility] Obstetrics/Gynecology [facility] OB   10/23/2017 10:00 AM Lashawn Matute DO Obstetrics/Gynecology [facility] OB   09/05/2017 02:20 PM Rand Palacio, [facility] Obstetrics/Gynecology [facility] OB   10/18/2017 09:40 AM Elizabet Salgado, [facility] Obstetrics/Gynecology [facility] OB   09/19/2017 09:20 KESHA Haro  Obstetrics/Gynecology [facility] OB   08/24/2017 09:20 AM KESHA Barber   Obstetrics/Gynecology [facility] OB     Signatures   Electronically signed by : KESHA Hahn ; Aug  9 2017 12:59PM EST                       (Author)

## 2018-01-10 NOTE — MISCELLANEOUS
Message   Recorded as Task   Date: 06/27/2017 09:30 AM, Created By: Mele Coyle   Task Name: Medical Complaint Callback   Assigned To: Les Nose   Regarding Patient: Marco Antonio Bernal, Status: Active   Comment:    Nunu Hodge - 27 Jun 2017 9:30 AM     TASK CREATED  Caller: Self; Medical Complaint; [phone] (Mobile Phone)  Pt is 22w and having very bad heartburn  Pt would like to know what she can take to relieve it  Olga Najera - 27 Jun 2017 9:43 AM     TASK REPLIED TO: Previously Assigned To [facility],Team  Called pt and discussed sx  States she had such bad indigestion last night that she vomited and got a bloody nose  Advised pt of pregnancy safe medications  She will call if sx persist on OTC medicines  Active Problems    1  Asthma (493 90) (J45 909)   2  Cough variant asthma (493 82) (J45 991)   3  Encounter for supervision of other normal pregnancy in second trimester (V22 1)   (Z34 82)   4  Intermittent self-catheterization of bladder (V49 89) (Z78 9)   5  Low grade squamous intraepithelial dysplasia affecting pregnancy in second trimester,   antepartum (472 95,222 1) (O99 89,R89 6)   6  Migraine (346 90) (G43 909)   7  Nausea and vomiting of pregnancy, antepartum (643 93) (O21 9)    Current Meds   1  Fioricet -40 MG TABS (Butalbital-APAP-Caffeine); Therapy: (Recorded:28Apr2017) to Recorded   2  PreNatal [facility] CAPS; Therapy: (Recorded:30Mar2017) to Recorded   3  Ventolin  (90 Base) MCG/ACT Inhalation Aerosol Solution; INHALE 1 TO 2   PUFFS EVERY 4 TO 6 HOURS AS NEEDED; Therapy: 75Rvk7525 to (Evaluate:20Apr2018)  Requested for: 25Apr2017; Last   Rx:25Apr2017 Ordered   4  Zofran 4 MG Oral Tablet (Ondansetron HCl); Therapy: (Recorded:01Jun2017) to Recorded    Allergies    1  No Known Drug Allergies    2  No Known Environmental Allergies   3   No Known Food Allergies    Signatures   Electronically signed by : Sekou Martinez, ; Jun 27 2017  9:43AM EST (Author)

## 2018-01-10 NOTE — MISCELLANEOUS
Message   Recorded as Task   Date: 04/20/2017 10:15 AM, Created By: Dana Shirley   Task Name: Follow Up   Assigned To: Silvano Nguyen   Regarding Patient: Stewart Denver, Status: In Progress   Comment:    Karolyn Wilson - 20 Apr 2017 10:15 AM     TASK CREATED  Can you let Abundio Fox know that her urine culture had some bacteria in it  I'd like her to take macrobid 100mg bid x 7d  Thanks! Elena Hussein - 20 Apr 2017 12:04 PM     TASK IN PROGRESS   Elena Keenan - 20 Apr 2017 12:17 PM     TASK EDITED  Call pt [facility] [facility] - 20 Apr 2017 12:22 PM     TASK REPLIED TO: Previously Assigned To [address]  sent to CAT for signature  Active Problems    1  Amenorrhea (626 0) (N91 2)   2  Encounter for pregnancy related examination in first trimester (V22 1) (Z34 81)   3  Encounter for prenatal care of first pregnancy, antepartum (V22 0) (Z34 00)   4  Female pelvic pain (625 9) (R10 2)   5  Urinary retention (788 20) (R33 9)   6  Urinary tract infection (599 0) (N39 0)    Current Meds   1  PreNatal [facility] CAPS; Therapy: (Recorded:30Mar2017) to Recorded    Allergies    1  No Known Drug Allergies    2  No Known Environmental Allergies   3   No Known Food Allergies    Plan  Encounter for prenatal care of first pregnancy, antepartum, Urinary tract infection    · Nitrofurantoin Macrocrystal 100 MG Oral Capsule (Macrodantin); TAKE 1 CAPSULE  EVERY 12 HOURS DAILY    Signatures   Electronically signed by : Brien Albright, ; Apr 20 2017 12:22PM EST                       (Author)

## 2018-01-10 NOTE — MISCELLANEOUS
Message   Recorded as Task   Date: 04/28/2017 12:09 PM, Created By: Pau Nguyen   Task Name: Follow Up   Assigned To: Kristine Rose   Regarding Patient: Davonte Land, Status: Active   Comment:    Gaurang,Jan - 28 Apr 2017 12:09 PM     TASK CREATED  pt called with c/o" N & V everyday"  pt is 13 wks 4 days & was seen here 4/25/17  she is requesting an antiemetic , but refuses to try anything OTC  wants a prescription sent to pharmacy  please advise  thank you, Katelyn Gracie - 28 Apr 2017 2:26 PM     TASK REPLIED TO: Previously Assigned To [facility],Team  Rx sent for Diclegis   Please notify patient   Pau Arthurwinnie - 28 Apr 2017 2:32 PM     TASK EDITED   Pau Nguyen - 28 Apr 2017 2:33 PM     TASK EDITED  informed pt of diclegis ordered to pharmacy  advised pt  call first prior to picking up & to take as directed  Active Problems    1  Asthma (493 90) (J45 909)   2  Cervical cancer screening (V76 2) (Z12 4)   3  Encounter for supervision of other normal pregnancy in first trimester (V22 1) (Z34 81)   4  Intermittent self-catheterization of bladder (V49 89) (Z78 9)   5  Migraine (346 90) (G43 909)   6  Nausea and vomiting of pregnancy, antepartum (643 93) (O21 9)   7  Urinary retention (788 20) (R33 9)   8  Urinary tract infection (599 0) (N39 0)    Current Meds   1  Diclegis 10-10 MG Oral Tablet Delayed Release; Take 2 TABs at bedtime nightly  If sx   persist, add 1 TAB each morning starting day 3  If sx persist, add 1 TAB every aftrnoon   starting day 4; Therapy: 28Apr2017 to (Jorden Jackson)  Requested for: 28Apr2017; Last   Rx:28Apr2017 Ordered   2  Fioricet -40 MG TABS (Butalbital-APAP-Caffeine); Therapy: (Recorded:28Apr2017) to Recorded   3  Nitrofurantoin Macrocrystal 100 MG Oral Capsule (Macrodantin); TAKE 1 CAPSULE   EVERY 12 HOURS DAILY; Therapy: 20Apr2017 to (Evaluate:27Apr2017)  Requested for: 20Apr2017; Last   Rx:20Apr2017 Ordered   4  PreNatal [facility];    Therapy: (Recorded:30Mar2017) to Recorded   5  Ventolin  (90 Base) MCG/ACT Inhalation Aerosol Solution; INHALE 1 TO 2   PUFFS EVERY 4 TO 6 HOURS AS NEEDED; Therapy: 25Apr2017 to (Evaluate:20Apr2018)  Requested for: 25Apr2017; Last   Rx:25Apr2017 Ordered    Allergies    1  No Known Drug Allergies    2  No Known Environmental Allergies   3   No Known Food Allergies    Signatures   Electronically signed by : Ed Sr RN; Apr 28 2017  2:33PM EST                       (Author)

## 2018-01-11 NOTE — MISCELLANEOUS
Message  Patient called to tell me that she did not go to triage as instructed  She said the baby is moving, she is getting her 10 kicks in 2 hours and feels better now  L&D notified  Active Problems    1  Abdominal pain (789 00) (R10 9)   2  Asthma (493 90) (J45 909)   3  Dizziness (780 4) (R42)   4  Encounter for supervision of other normal pregnancy in second trimester (V22 1)   (Z34 82)   5  Lightheaded (780 4) (R42)   6  Low grade squamous intraepithelial dysplasia affecting pregnancy in second trimester,   antepartum (832 50,036 1) (O99 89,R89 6)   7  Lower back pain (724 2) (M54 5)   8  Migraine (346 90) (G43 909)   9  Mild cervical dysplasia (622 11) (N87 0)   10  Nausea (787 02) (R11 0)   11  Nausea and vomiting of pregnancy, antepartum (643 93) (O21 9)   12  Pregnancy (V22 2) (Z34 90)   13  Shortness of breath (786 05) (R06 02)    Current Meds   1  Fioricet -40 MG TABS (Butalbital-APAP-Caffeine); Therapy: (Recorded:28Apr2017) to Recorded   2  Ondansetron 4 MG Oral Tablet Disintegrating (Zofran ODT); TAKE 4 MG Every 6 hours    Requested for: 75Vnv3402; Last Rx:82Rim6896 Ordered   3  Prenatal Multi +DHA 27-0 8-228 MG Oral Capsule; Take one tablet daily; Therapy: 73STL1292 to (Evaluate:88Dlg8408)  Requested for: 38VSN3546; Last   Rx:38Zdr2540 Ordered   4  Ventolin  (90 Base) MCG/ACT Inhalation Aerosol Solution; INHALE 1 TO 2   PUFFS EVERY 4 TO 6 HOURS AS NEEDED; Therapy: 26Mdd5125 to (Evaluate:19Pqy6233)  Requested for: 56Vhw8986; Last   Rx:12Sgg7798 Ordered    Allergies    1  No Known Drug Allergies    2  No Known Environmental Allergies   3   No Known Food Allergies    Signatures   Electronically signed by : Teresa Fisher, ; Sep 13 2017  6:37PM EST                       (Author)

## 2018-01-11 NOTE — MISCELLANEOUS
Message   Recorded as Task   Date: 04/13/2017 02:55 PM, Created By: Roderick Read   Task Name: Call Back   Assigned To: Ayden Hernandez   Regarding Patient: Venkatesh Tejeda, Status: Active   Comment:    Karolyn Cano - 13 Apr 2017 2:55 PM     TASK CREATED  Caller: Fiordaliza Hernandez, Primary Care Provider; Other   FAMILY MED, [facility] call [phone], they would like to know if its ok for them to prescribe feurocet for pts headaches   Citlalliyann Eduardo - 13 Apr 2017 3:19 PM     TASK EDITED  I called back - said we do allow fioricet in pregnancy        Active Problems    1  Amenorrhea (626 0) (N91 2)   2  Encounter for pregnancy related examination in first trimester (V22 1) (Z34 81)   3  Encounter for prenatal care of first pregnancy, antepartum (V22 0) (Z34 00)   4  Female pelvic pain (625 9) (R10 2)   5  Urinary retention (788 20) (R33 9)    Current Meds   1  PreNatal [facility] CAPS; Therapy: (Recorded:30Mar2017) to Recorded    Allergies    1  No Known Drug Allergies    2  No Known Environmental Allergies   3  No Known Food Allergies    Signatures   Electronically signed by :  Kendra Grossman, ; Apr 13 2017  3:19PM EST                       (Author)

## 2018-01-11 NOTE — MISCELLANEOUS
Message   Recorded as Task   Date: 08/02/2017 04:31 PM, Created By: Jonathan Rizvi   Task Name: Go to Result   Assigned To: Cristi Licona [facility],Team   Regarding Patient: Konstantin Morataya, Status: Active   Comment:    Celina Morrison - 02 Aug 2017 4:31 PM     TASK CREATED  please let pt know she passed her 1hr gtt but her hb is low - needs iron   Gaurang,Jan - 03 Aug 2017 7:51 AM     TASK EDITED  called pt- L/M informing pt of normal 1 hr gtt, low hbg &  need to start taking iron daily  pt advised to call office with any questions/concerns  Active Problems    1  Asthma (493 90) (J45 909)   2  Encounter for supervision of other normal pregnancy in second trimester (V22 1)   (Z34 82)   3  Low grade squamous intraepithelial dysplasia affecting pregnancy in second trimester,   antepartum (559 98,825 5) (O99 89,R89 6)   4  Migraine (346 90) (G43 909)   5  Nausea and vomiting of pregnancy, antepartum (643 93) (O21 9)   6  Pregnancy (V22 2) (Z34 90)    Current Meds   1  Fioricet -40 MG TABS (Butalbital-APAP-Caffeine); Therapy: (Recorded:28Apr2017) to Recorded   2  Ondansetron HCl - 4 MG Oral Tablet (Zofran); TAKE 1 TABLET BY MOUTH EVERY 6   HOURS AS NEEDED FOR NAUSEA AND VOMITING  Requested for: 56Icw1255; Last   Rx:27Iju3547 Ordered   3  PreNatal [facility] CAPS; Therapy: (Recorded:01Xvl4511) to Recorded   4  Prenatal Multi +DHA 27-0 8-228 MG Oral Capsule; Take one tablet daily; Therapy: 05CWX9099 to (Evaluate:16Nln7915)  Requested for: 92LUT6509; Last   Rx:28Rrn1508 Ordered   5  Ventolin  (90 Base) MCG/ACT Inhalation Aerosol Solution; INHALE 1 TO 2   PUFFS EVERY 4 TO 6 HOURS AS NEEDED; Therapy: 89Sko3193 to (Evaluate:76Yqg3733)  Requested for: 16Skh3347; Last   Rx:41Qma9203 Ordered    Allergies    1  No Known Drug Allergies    2  No Known Environmental Allergies   3   No Known Food Allergies    Signatures   Electronically signed by : Jorge Garcia RN; Aug  3 2017  7:51AM EST                       (Author)

## 2018-01-11 NOTE — MISCELLANEOUS
Message   Recorded as Task   Date: 10/09/2017 07:19 AM, Created By: Allen Velásquez   Task Name: Medical Complaint Callback   Assigned To: Yvette Christian   Regarding Patient: Haresh De La Garza, Status: In Progress   Comment:    Nunu Hodge - 09 Oct 2017 7:19 AM     TASK CREATED  Caller: Self; Medical Complaint; [phone] (Mobile Phone)  Pt is 37w and spoke w on call  yesterday due to vomiting and diarrhea and was told that if it was not better to call today  Pt states this has been going on since Saturday and would like to speak with a nurse about what to do  GaurangLayo - 09 Oct 2017 7:27 AM     TASK IN PROGRESS   Gaurang,Layo - 09 Oct 2017 7:36 AM     TASK EDITED  pt called stating, "I've had vomiting & diarrhea since sat  & cannot keep even water down "  advised pt report to [facility] for hydration  pt states she will go to /er  advised pt call back this afternoon to report if feeling better  Active Problems    1  Abdominal pain (789 00) (R10 9)   2  Asthma (493 90) (J45 909)   3  Dizziness (780 4) (R42)   4  Encounter for supervision of other normal pregnancy, third trimester (V22 1) (Z34 83)   5  Lightheaded (780 4) (R42)   6  Low grade squamous intraepithelial dysplasia affecting pregnancy in second trimester,   antepartum (711 47,892 9) (O99 89,R89 6)   7  Lower back pain (724 2) (M54 5)   8  Migraine (346 90) (G43 909)   9  Mild cervical dysplasia (622 11) (N87 0)   10  Nausea (787 02) (R11 0)   11  Nausea and vomiting of pregnancy, antepartum (643 93) (O21 9)   12  Pregnancy (V22 2) (Z34 90)   13  Shortness of breath (786 05) (R06 02)    Current Meds   1  Butalbital-APAP-Caffeine -40 MG Oral Capsule (Fioricet); TAKE 2 CAPSULE   Every 4 hours PRN headache MDD:6 capsules; Therapy: 71GHC0411 to (Evaluate:09Oct2017)  Requested for: 12AAD2363; Last   Rx:03Oct2017 Ordered   2  Butalbital-APAP-Caffeine -40 MG Oral Tablet; Take 1 tablet 3 times daily as   needed;  Last Rx: 63SGZ0020 Ordered   3  Ondansetron 4 MG Oral Tablet Disintegrating (Zofran ODT); TAKE 4 MG Every 6 hours    Requested for: 11ZWI9874; Last Rx:03Oct2017 Ordered   4  Prenatal Multi +DHA 27-0 8-228 MG Oral Capsule; Take one tablet daily; Therapy: 45NIC0112 to (Evaluate:01Jan2018)  Requested for: 60OMP6261; Last   Rx:03Oct2017 Ordered   5  Ventolin  (90 Base) MCG/ACT Inhalation Aerosol Solution; INHALE 1 TO 2   PUFFS EVERY 4 TO 6 HOURS AS NEEDED; Therapy: 25Apr2017 to (Evaluate:20Apr2018)  Requested for: 25Apr2017; Last   Rx:25Apr2017 Ordered    Allergies    1  No Known Drug Allergies    2  No Known Environmental Allergies   3   No Known Food Allergies    Signatures   Electronically signed by : Eleazar Jackson RN; Oct  9 2017  7:37AM EST                       (Author)

## 2018-01-11 NOTE — MISCELLANEOUS
Message   Recorded as Task   Date: 09/15/2017 07:21 AM, Created By: Juancho Iverson   Task Name: Go to Result   Assigned To: Silvano Nguyen   Regarding Patient: Stewart Denver, Status: In Progress   CommentRobertha Anes - 15 Sep 2017 7:21 AM     TASK CREATED  please call Abundio Fox- her labs are normal except for anemia  please verify that she is taking PNV and iron   [place]omat[place]le - 15 Sep 2017 8:07 AM     TASK IN PROGRESS   Michelle Pineda - 15 Sep 2017 8:10 AM     TASK EDITED  [facility] for pt to cb for results       ext: [phone]   Pranay Mejia - 15 Sep 2017 8:31 AM     TASK EDITED  Spoke with pt  She is taking PNV - will add OTC iron now  Iron letter mailed to pt  Active Problems    1  Abdominal pain (789 00) (R10 9)   2  Asthma (493 90) (J45 909)   3  Dizziness (780 4) (R42)   4  Encounter for supervision of other normal pregnancy in second trimester (V22 1)   (Z34 82)   5  Lightheaded (780 4) (R42)   6  Low grade squamous intraepithelial dysplasia affecting pregnancy in second trimester,   antepartum (428 12,126 6) (O99 89,R89 6)   7  Lower back pain (724 2) (M54 5)   8  Migraine (346 90) (G43 909)   9  Mild cervical dysplasia (622 11) (N87 0)   10  Nausea (787 02) (R11 0)   11  Nausea and vomiting of pregnancy, antepartum (643 93) (O21 9)   12  Pregnancy (V22 2) (Z34 90)   13  Shortness of breath (786 05) (R06 02)    Current Meds   1  Fioricet -40 MG TABS (Butalbital-APAP-Caffeine); Therapy: (Recorded:70Nlu0053) to Recorded   2  Ondansetron 4 MG Oral Tablet Disintegrating (Zofran ODT); TAKE 4 MG Every 6 hours    Requested for: 94Njm6023; Last Rx:36Fvx1846 Ordered   3  Prenatal Multi +DHA 27-0 8-228 MG Oral Capsule; Take one tablet daily; Therapy: 71HSD1300 to (Evaluate:17Exz9950)  Requested for: 87VWF9032; Last   Rx:46Qny7351 Ordered   4  Ventolin  (90 Base) MCG/ACT Inhalation Aerosol Solution; INHALE 1 TO 2   PUFFS EVERY 4 TO 6 HOURS AS NEEDED;    Therapy: 25Apr2017 to (Evaluate:20Apr2018)  Requested for: 25Apr2017; Last   Rx:25Apr2017 Ordered    Allergies    1  No Known Drug Allergies    2  No Known Environmental Allergies   3   No Known Food Allergies    Signatures   Electronically signed by : Milton Braswell, ; Sep 15 2017  8:38AM EST                       (Author)

## 2018-01-11 NOTE — MISCELLANEOUS
Message   Recorded as Task   Date: 10/24/2017 09:58 AM, Created By: Isael Vasquez   Task Name: Medical Complaint Callback   Assigned To: Ml Wynn   Regarding Patient: Gabriel Chanel, Status: In Progress   Comment:    Archana Nelson - 24 Oct 2017 9:58 AM     TASK CREATED  Caller: Self; Medical Complaint; [phone] (Mobile Phone)  pt is having swelling in her legs; one more than the other-is this something to be concerned about? [phone]  Sendy Robles - 24 Oct 2017 10:02 AM     TASK IN PROGRESS   Sendy Robles - 24 Oct 2017 10:12 AM     TASK EDITED  Pain in both legs  Increased pain in Left leg  Started last night  Sendy Robles - 24 Oct 2017 10:18 AM     TASK EDITED  Spoke with Dr Thomas Gentile  Legs not warm to the touch  No color changes  No increased pain with flexing foot  Pain started last night  Normal visit with Dr Mitchel Valiente yesterday  Per Dr Thomas Gentile to be sent to L+D for blood pressure and leg check  Patient aware to go to L+D  Active Problems    1  Abdominal pain (789 00) (R10 9)   2  Asthma (493 90) (J45 909)   3  Dizziness (780 4) (R42)   4  Encounter for supervision of other normal pregnancy in third trimester (V22 1) (Z34 83)   5  Encounter for supervision of other normal pregnancy, third trimester (V22 1) (Z34 83)   6  GBS (group B Streptococcus carrier), +RV culture, currently pregnant (V23 89,V02 51)   (O99 820)   7  Lightheaded (780 4) (R42)   8  Low grade squamous intraepithelial dysplasia affecting pregnancy in second trimester,   antepartum (783 89,381 8) (O99 89,R89 6)   9  Lower back pain (724 2) (M54 5)   10  Migraine (346 90) (G43 909)   11  Mild cervical dysplasia (622 11) (N87 0)   12  Nausea and vomiting of pregnancy, antepartum (643 93) (O21 9)   13  Pregnancy (V22 2) (Z34 90)   14  Shortness of breath (786 05) (R06 02)    Current Meds   1   Butalbital-APAP-Caffeine -40 MG Oral Capsule (Fioricet); TAKE 2 CAPSULE   Every 4 hours PRN headache MDD:6 capsules; Therapy: 43KKK6707 to (Evaluate:09Oct2017)  Requested for: 04BNJ4863; Last   Rx:03Oct2017 Ordered   2  Butalbital-APAP-Caffeine -40 MG Oral Tablet; Take 1 tablet 3 times daily as   needed; Last Rx:03Oct2017 Ordered   3  Ondansetron 4 MG Oral Tablet Disintegrating (Zofran ODT); TAKE 4 MG Every 6 hours    Requested for: 29EHF9637; Last Rx:03Oct2017 Ordered   4  Prenatal Multi +DHA 27-0 8-228 MG Oral Capsule; Take one tablet daily; Therapy: 76MOA2881 to (Evaluate:01Jan2018)  Requested for: 92QSB2336; Last   Rx:03Oct2017 Ordered   5  Ventolin  (90 Base) MCG/ACT Inhalation Aerosol Solution; INHALE 1 TO 2   PUFFS EVERY 4 TO 6 HOURS AS NEEDED; Therapy: 25Apr2017 to (Evaluate:20Apr2018)  Requested for: 56Bdh4706; Last   Rx:25Apr2017 Ordered    Allergies    1  No Known Drug Allergies    2  No Known Environmental Allergies   3   No Known Food Allergies    Signatures   Electronically signed by : Connor Reese, ; Oct 24 2017 10:20AM EST                       (Author)

## 2018-01-11 NOTE — PROGRESS NOTES
2017         RE: Jose Loop                             To: [facility]  MR#: [id number]                                   [address]   : [place] [address]   ENC: [id number]                             Zneon, Leonel Wg Bryanna Ayala   (Exam #: [id number])                           Fax: [phone]      The LMP of this 32year old,  G2, P0-0-1-0 patient was 2017, giving   her an SHIRA of OCT 27 2017 and a current gestational age of 16 weeks 3 days   by dates  A sonographic examination was performed on 2017 using   real time equipment  The ultrasound examination was performed using   abdominal technique  The patient has a BMI of 22 5  Her blood pressure   today was 112/70  Earliest ultrasound found in her record: 3/30/17  10wks  10/26/17 SHIRA   Multiple longitudinal and transverse sections revealed a nicole   intrauterine pregnancy  The placenta is posterior in implantation, grade 0   in appearance  Cardiac motion was observed at 148 bpm       INDICATIONS      first trimester genetic screening      Exam Types      sequential screen      RESULTS      Fetus # 1 of 1   Fetal growth appeared normal      MEASUREMENTS (* Included In Average GA)      CRL              7 0 cm        13 weeks 0 days*   Nuchal Trans    1 90 mm      THE AVERAGE GESTATIONAL AGE is 13 weeks 0 days +/- 7 days  ANATOMY COMMENTS      Anatomic detail is limited at this gestational age  The yolk sac was not   noted  The fetal cranium appeared normal in shape and the nuchal   translucency was normal in size (1 9mm)  The nasal bone appears to be   present  The intracranial anatomy was unremarkable  Evaluation of the   spine revealed no obvious evidence for a neural tube defect  Anatomy of   the fetal thorax appeared within normal limits  The cardiac rhythm was   regular    Within the abdomen, stomach & bladder were visualized and the abdominal wall appeared intact  A three vessel cord appears to be present  Active movement of the fetal body & extremities was seen  There is no   suspicion of a subchorionic bleed  The placental cord insertion was   normal    There is no suspicion of a uterine myoma  Free fluid is not seen   in the posterior cul-de-sac  ADNEXA      The left ovary appeared normal and measured 2 2 x 1 6 x 1 7 cm with a   volume of 3 1 cc  The right ovary appeared normal and measured 3 1 x 1 5 x   1 5 cm with a volume of 3 6 cc       AMNIOTIC FLUID         Largest Vertical Pocket = 3 4 cm   Amniotic Fluid: Normal      IMPRESSION      [place] IUP   13 weeks and 0 days by this ultrasound  (SHIRA=OCT 26 2017)   Fetal growth appeared normal   Regular fetal heart rate of 148 bpm   Posterior placenta      CONSULT COMMENT      Thank you very much for requesting consultation on this very nice patient   for the indication of genetic screening  This is the patient's second   pregnancy  Her first pregnancy resulted in a miscarriage in 2006  She   has asthma for which he utilizes her albuterol a couple times a week  It   is mostly exacerbated by allergies  She has eczema  She was diagnosed as   having a neurogenic bladder for which she self catheterizes infrequently  The etiology of her neurogenic bladder is unknown  She has a surgical   history of a tonsillectomy and diagnostic laparoscopy for endometriosis  She denies the current use of tobacco, alcohol, or drugs  She currently   takes prenatal vitamins and has no significant drug allergies  She was   born with a heart murmur not requiring any intervention  A review of   systems is otherwise negative  On exam, the patient appears well, in no   acute distress, and her abdomen is nontender  She gets occasional migraines    HerWe discussed the options for genetic   screening, including but not limited to first trimester screening, second   trimester screening, combined first and second trimester screening,   noninvasive prenatal testing (NIPT) for patients at high risk and   diagnostic screening through the use of CVS and amniocentesis  We   discussed the risks and benefits of each approach including the   sensitivities and false positive rates as well as the difference between a   screening test and a diagnostic test   At the conclusion of our discussion   the patient elected Stepwise Sequential Screening to delineate her risk   for fetal aneuploidy  She was given a requisition to go to [facility] to have   the first trimester blood work drawn  The first trimester portion of the   screening results, encompassing age, nuchal translucency, and biochemistry   should be available within one week of testing and will be reported from   [facility]   The second stage of sequential screening should be completed   between the 15th and 21st week of pregnancy (ideally between 16-18 weeks)  We discussed that asthma puts her at increased risk for adverse pregnancy   outcome and adverse maternal outcome secondary to the physiologic changes   of pregnancy and that is very important to control asthma well during   pregnancy as it is outside of pregnancy  In general, asthma is managed   similarly during pregnancy as it is outside of pregnancy with a rescue   inhaler (beta 2 agonist) as needed and inhaled corticosteroids or inhaled   long-acting beta-2 agonists for severe persistent asthma  I recommend   monitoring her asthma through the use of a peak flow monitor to obtain   objective evidence of adequate control  We discussed follow-up in detail and I recommend an anatomy ultrasound be   scheduled for 20 weeks gestation  Thank you very much for allowing us to participate in the care of this   very nice patient  Should you have any questions, please do not hesitate   to contact our office        Please note, in addition to the time spent discussing the results of the ultrasound, I spent approximately 15 minutes of face-to-face time with the   patient, greater than 50% of which was spent in counseling and the   coordination of care for this patient  Portions of the record may have been created with voice recognition   software  Occasional wrong word or "sound a like" substitutions may have   occurred due to the inherent limitations of voice recognition software  Read the chart carefully and recognize, using context, where substitutions   have occurred  SHANNAN Gandara M D  Electronically signed 04/20/17 17:34           Electronically signed by:Karolyn TUCKER    Apr 21 2017 10:32AM EST

## 2018-01-12 NOTE — MISCELLANEOUS
Message   Recorded as Task   Date: 08/28/2017 08:16 AM, Created By: Rosio Wmyan   Task Name: Call Back   Assigned To: Tete De La Cruz   Regarding Patient: Gudelia Valenzuela, Status: In Progress   Comment:    Lenka Ca - 28 Aug 2017 8:16 AM     TASK CREATED  PT CALLED STATING THAT SHE KEEPS HAVING TO LEAVE WORK EARLY DUE TO GOING TO L&D SO HER BOSS SAID THAT SHE CAN ADJUST HER HOURS TO PART-TIME 30 HRS/WK BUT NEEDS A NOTE TO DO THIS  HER [facility]- [phone]   [employer] - 28 Aug 2017 8:19 AM     TASK EDITED  You saw this pt last week for PNAT  She then called back to the office same day with pain complaints and you advised she be seen in triage  Do you feel we can write this requested note for pt? Thank you! Archana Nelson - 28 Aug 2017 9:18 AM     TASK EDITED  pt is stating she needs this work note faxed to her office to her attention  fax # [phone]  Dilan Maurer - 29 Aug 2017 2:20 PM     TASK REPLIED TO: Previously Assigned To Dilan Maurer                      note written   [employer] - 28 Aug 2017 2:28 PM     TASK IN PROGRESS   [employer] - 28 Aug 2017 2:34 PM     TASK EDITED  Note faxed to pt  [place] - 72 Aug 2017 2:34 PM     TASK Suyapa Peeks - 28 Aug 2017 3:36 PM     TASK REACTIVATED   [employer] - 28 Aug 2017 3:37 PM     TASK EDITED      Note was faxed to pt per her request  Pt called back to the office again    states the note must now say no more than 22 hours of work weekly    Please advise  Thank you! Dilan Maurer - 28 Aug 2017 4:16 PM     TASK REPLIED TO: Previously Assigned To Dilan Maurer     ok for note to state no more than 22 hours weekly   [employer] - 29 Aug 2017 7:48 AM     TASK IN PROGRESS   [employer] - 29 Aug 2017 7:53 AM     TASK EDITED  Revised note faxed to pt per her request  Fax #[phone]  Active Problems    1  Abdominal pain (789 00) (R10 9)   2  Asthma (493 90) (J97 909)   3   Encounter for supervision of other normal pregnancy in second trimester (V22 1)   (Z34 82)   4  Low grade squamous intraepithelial dysplasia affecting pregnancy in second trimester,   antepartum (223 80,951 5) (O99 89,R89 6)   5  Lower back pain (724 2) (M54 5)   6  Migraine (346 90) (G43 909)   7  Mild cervical dysplasia (622 11) (N87 0)   8  Nausea and vomiting of pregnancy, antepartum (643 93) (O21 9)   9  Pregnancy (V22 2) (Z34 90)    Current Meds   1  [facility] Womens Prenatal+DHA 28-0 975 & 200 MG Oral Miscellaneous; Therapy: 92AGJ3686 to Recorded   2  Fioricet -40 MG TABS (Butalbital-APAP-Caffeine); Therapy: (Recorded:28Apr2017) to Recorded   3  Ondansetron 4 MG Oral Tablet Disintegrating (Zofran ODT); TAKE 4 MG Every 6 hours    Requested for: 00Hwq4281; Last Rx:61Ueo2256 Ordered   4  PreNatal [facility] CAPS; Therapy: (Recorded:78Giz5997) to Recorded   5  Prenatal Multi +DHA 27-0 8-228 MG Oral Capsule; Take one tablet daily; Therapy: 48HSL0885 to (Evaluate:37Ezx7024)  Requested for: 19CWY2299; Last   Rx:09Eut0554 Ordered   6  Ventolin  (90 Base) MCG/ACT Inhalation Aerosol Solution; INHALE 1 TO 2   PUFFS EVERY 4 TO 6 HOURS AS NEEDED; Therapy: 52Wba2315 to (Evaluate:51Jcm2667)  Requested for: 74Jli3527; Last   Rx:91Cox3233 Ordered    Allergies    1  No Known Drug Allergies    2  No Known Environmental Allergies   3   No Known Food Allergies    Signatures   Electronically signed by : Jose Tapia, ; Aug 29 2017  7:54AM EST                       (Author)

## 2018-01-12 NOTE — MISCELLANEOUS
Message  Patient called around 11:00 this am  She is 38 weeks and felt like she leaked some fluid on Saturday while getting out of the car  She thinks it may have happened this morning also but she does leak urine at times  I told her to empty her bladder, put on a panty liner and to call me back in 1 hour with an update  Patient never called back  I called her and got voice mail  I did leave a message to call me  Active Problems    1  Abdominal pain (789 00) (R10 9)   2  Asthma (493 90) (J45 909)   3  Dizziness (780 4) (R42)   4  Encounter for supervision of other normal pregnancy in third trimester (V22 1) (Z34 83)   5  Encounter for supervision of other normal pregnancy, third trimester (V22 1) (Z34 83)   6  GBS (group B Streptococcus carrier), +RV culture, currently pregnant (V23 89,V02 51)   (O99 820)   7  Lightheaded (780 4) (R42)   8  Low grade squamous intraepithelial dysplasia affecting pregnancy in second trimester,   antepartum (822 12,300 9) (O99 89,R89 6)   9  Lower back pain (724 2) (M54 5)   10  Migraine (346 90) (G43 909)   11  Mild cervical dysplasia (622 11) (N87 0)   12  Nausea (787 02) (R11 0)   13  Nausea and vomiting of pregnancy, antepartum (643 93) (O21 9)   14  Pregnancy (V22 2) (Z34 90)   15  Shortness of breath (786 05) (R06 02)    Current Meds   1  Butalbital-APAP-Caffeine -40 MG Oral Capsule (Fioricet); TAKE 2 CAPSULE   Every 4 hours PRN headache MDD:6 capsules; Therapy: 22LDC7009 to (Evaluate:09Oct2017)  Requested for: 39MCQ2319; Last   Rx:03Oct2017 Ordered   2  Butalbital-APAP-Caffeine -40 MG Oral Tablet; Take 1 tablet 3 times daily as   needed; Last Rx:03Oct2017 Ordered   3  Ondansetron 4 MG Oral Tablet Disintegrating (Zofran ODT); TAKE 4 MG Every 6 hours    Requested for: 03IKQ2484; Last Rx:03Oct2017 Ordered   4  Prenatal Multi +DHA 27-0 8-228 MG Oral Capsule; Take one tablet daily;    Therapy: 69BCY7503 to (Evaluate:01Jan2018)  Requested for: 31NAW1027; Last Rx: 00MME2118 Ordered   5  Ventolin  (90 Base) MCG/ACT Inhalation Aerosol Solution; INHALE 1 TO 2   PUFFS EVERY 4 TO 6 HOURS AS NEEDED; Therapy: 25Apr2017 to (Evaluate:20Apr2018)  Requested for: 25Apr2017; Last   Rx:25Apr2017 Ordered    Allergies    1  No Known Drug Allergies    2  No Known Environmental Allergies   3   No Known Food Allergies    Signatures   Electronically signed by : Enriqueta Bennett, ; Oct 16 2017  3:31PM EST                       (Author)

## 2018-01-12 NOTE — PROCEDURES
Procedures by Sonia Tejeda MD at 10/24/2017  4:41 PM      Author:  Sonia Tejeda MD Service:  OB/GYN Author Type:  Resident    Filed:  10/24/2017  4:46 PM Date of Service:  10/24/2017  4:41 PM Status:  Attested    :  Sonia Tejeda MD (Resident)  Cosigner:  Marzena Martin MD at 10/26/2017  1:05 PM    Attestation signed by Marzena Martin MD at 10/26/2017  1:05 PM      I have reviewed the notes, assessments, and/or procedures performed by Dr Noel Agrawal and Fabien Minor, I concur with her/his documentation of Destiny [place]  Procedures    Dr Noel Agrawal placed venegas balloon  @4324 via direct visualization of cervix using speculum  Cervix was cleaned with betadine  Ringed forceps were used to grasp catheter and slowly introduce through  internal os  Balloon was filled 60cc of NS and weighted  Pt  Tolerated procedure well  Francoise Angelo MD  OBGYN, PGY-1  10/24/2017 4:45 PM               Received Frankey Shames M D    Oct 26 2017  1:05PM [facility] Standard Time

## 2018-01-12 NOTE — MISCELLANEOUS
Message  Patient stopped back in today with prescription that she was given by Dr Destin Salazar  The prescription had someone else's name sticker on it and she was unable to get it filled  Requesting that prescription be sent to [facility] on [address]  Plan  Asthma    · Azithromycin 250 MG Oral Tablet (Zithromax Z-Bertram); TAKE 2 TABLETS ON DAY 1  THEN TAKE 1 TABLET A DAY FOR 4 DAYS    Signatures   Electronically signed by :  Allyson Dyer [facility]; Jun 7 2017  9:15AM EST                       (Author)

## 2018-01-13 VITALS
SYSTOLIC BLOOD PRESSURE: 115 MMHG | DIASTOLIC BLOOD PRESSURE: 58 MMHG | BODY MASS INDEX: 24.84 KG/M2 | HEIGHT: 61 IN | WEIGHT: 131.6 LBS

## 2018-01-13 VITALS
HEIGHT: 61 IN | WEIGHT: 126.8 LBS | BODY MASS INDEX: 23.94 KG/M2 | SYSTOLIC BLOOD PRESSURE: 110 MMHG | DIASTOLIC BLOOD PRESSURE: 70 MMHG

## 2018-01-13 VITALS
WEIGHT: 121 LBS | SYSTOLIC BLOOD PRESSURE: 100 MMHG | DIASTOLIC BLOOD PRESSURE: 60 MMHG | BODY MASS INDEX: 22.26 KG/M2 | HEIGHT: 62 IN

## 2018-01-13 VITALS
BODY MASS INDEX: 26.06 KG/M2 | HEIGHT: 61 IN | DIASTOLIC BLOOD PRESSURE: 70 MMHG | SYSTOLIC BLOOD PRESSURE: 110 MMHG | WEIGHT: 138 LBS

## 2018-01-13 NOTE — RESULT NOTES
Verified Results  (Q) STEPWISE, PART 2 [id number] 12:00PM Zari Amos     Test Name Result Flag Reference   INTERPRETATION SEE NOTE     SCREEN NEGATIVE FOR OPEN NTD, DOWN SYNDROME AND TRISOMY 18   NT WAS USED IN THE RISK CALCULATIONS  RISK FOR ONTD <1:5000     AGE RISK DOWN SYNDROME 1:930     MIGUELITO DOWN SYNDROME RISK <1:5000  <1:270   RISK FOR TRISOMY 18 <1:5000  <1:100   CALCULATED GESTATIONAL$AGE 19 0     Crown rump length (CRL) was used to calculate gestational  age  SHIRA, if provided, was not used for gestational age  dating  AFP, SERUM 35 0 ng/mL     AFP MOM 0 65     Reference Range:                                        <2 50                                        IDD        <1 90                                        TWINS      <4 00                                        TWINS IDD  <3 50                                        TRIPLETS   <4 50   HCG, SERUM 20 0 IU/mL     HCG MOM 0 83     ESTRIOL, FREE 1 71 ng/mL     ESTRIOL MOM 0 92     INHIBIN A, DIMERIC 121 pg/mL     INHIBIN A MOM 0 70     PAVAN A 714 8 ng/mL     This test was performed using a kit that has not been  cleared or approved by the FDA  The analytical performance  characteristics of this test have been determined by [facility]  This test  should not be used for diagnosis without confirmation by  other medically established means  PAVAN-A MOM 0 51     NT MOM 1 22     The maternal serum screening results indicate a lower risk  of trisomy 21 in this pregnancy  The nasal bone was assessed  via ultrasound and was present  The combined risk is  therefore likely to be less than the calculated risk  Other  findings later in the pregnancy may change the risk  Nasal bone assessment is best accomplished through a fetal  ultrasound performed between 11 weeks 0 days through 13  weeks 6 days   In assessing the risk for aneuploidy, the  evaluation of the maternal serum markers plus the nuchal  thickness measurement is calculated first  Any potential  change to the patient's risk for aneuploidy depends on the  nuchal thickness, crown-rump length, and the ethnic origin,  and therefore the values generated by the algorithm itself  will not change  Additional information about the assessment  of the fetal nasal bone may be found on the [facility] website at  http://www  fetalmedicine com/fmf/training-certification/cert  elvdyyfk-bq-uhsrf  tence/11-13-week-scan/assessment-of-the-nasal-bone/  The Sequential Integrated Screen combines PAVAN-A and hCG  with or without a nuchal translucency measurement in the  first trimester with AFP, unconjugated estriol, intact hCG  and Inhibin A in the second trimester  This provides a  useful screening test for detection of open neural tube  defects, Down syndrome and Trisomy 18  It should be noted  that normal results can never guarantee the birth of a  normal baby and that 2 to 3 percent of newborns have some  type of physical or mental defect, many of which are  undetectable through any known prenatal diagnostic  technique  This is a screening test, not a diagnostic test      This risk assessment is based on demographic data provided  by the ordering physician  Please notify the laboratory  promptly if any data are incorrect  If you have questions concerning this report: For clinical consultation, call [phone]; For technical questions, call [phone] ext [phone]; For recalculations, fax to [phone]     REFERRING PHYSICIAN NAME [facility] PHYSICIAN PHONE [phone]     REFERRING PHYSICIAN ISAURA [id number]     SPECIMEN # FROM PART 1 [id number]     DATE OF BIRTH 1990     COLLECTION DATE 06/01/2017     SHIRA: 10/30/2017     NUCHAL TRANSLUCENCY 1 9 mm     MOTHER'S ETHNIC ORIGIN      INSULIN DEPEND DIABETIC NO     REPEAT SPECIMEN NO     NUMBER OF FETUSES 1     HX OF NEURAL TUBE DEFECTS NO     MATERNAL WEIGHT 126 lbs     Crown Rump Length 70 mm     Ultrasound Date 04/20/2017     Nasal Bone PRESENT     Twin B Nasal Bone NOT GIVEN     For additional information, please refer to  http://OANDA/faq/FAQ26  (This link is provided for more informational/educational  purposes only )

## 2018-01-14 VITALS
HEIGHT: 62 IN | WEIGHT: 123 LBS | BODY MASS INDEX: 22.63 KG/M2 | SYSTOLIC BLOOD PRESSURE: 112 MMHG | DIASTOLIC BLOOD PRESSURE: 70 MMHG

## 2018-01-14 VITALS
SYSTOLIC BLOOD PRESSURE: 110 MMHG | WEIGHT: 125 LBS | BODY MASS INDEX: 23 KG/M2 | DIASTOLIC BLOOD PRESSURE: 70 MMHG | HEIGHT: 62 IN

## 2018-01-15 VITALS
DIASTOLIC BLOOD PRESSURE: 80 MMHG | SYSTOLIC BLOOD PRESSURE: 148 MMHG | WEIGHT: 138 LBS | BODY MASS INDEX: 26.06 KG/M2 | HEIGHT: 61 IN

## 2018-01-15 NOTE — MISCELLANEOUS
Message  Left voicemail message today @ [phone] for Pt's nurse , Sandy De Dios, of [facility]  Informed Yady, via voicemail message, that Pt has been scheduled for post partum office visit on 11/30/17 at 11:20 am with Dr Juan Hodgson  Reiterated, during voicemail message, that if she has any questions/concerns, to contact office  Active Problems    1  Abdominal pain (789 00) (R10 9)   2  Asthma (493 90) (J45 909)   3  Dizziness (780 4) (R42)   4  Encounter for supervision of other normal pregnancy in third trimester (V22 1) (Z34 83)   5  Encounter for supervision of other normal pregnancy, third trimester (V22 1) (Z34 83)   6  GBS (group B Streptococcus carrier), +RV culture, currently pregnant (V23 89,V02 51)   (O99 820)   7  Lightheaded (780 4) (R42)   8  Low grade squamous intraepithelial dysplasia affecting pregnancy in second trimester,   antepartum (345 87,561 4) (O99 89,R89 6)   9  Lower back pain (724 2) (M54 5)   10  Migraine (346 90) (G43 909)   11  Mild cervical dysplasia (622 11) (N87 0)   12  Nausea and vomiting of pregnancy, antepartum (643 93) (O21 9)   13  Pregnancy (V22 2) (Z34 90)   14  Shortness of breath (786 05) (R06 02)    Current Meds   1  Butalbital-APAP-Caffeine -40 MG Oral Capsule (Fioricet); TAKE 2 CAPSULE   Every 4 hours PRN headache MDD:6 capsules; Therapy: 65WRN4455 to (Evaluate:09Oct2017)  Requested for: 43ZGH9796; Last   Rx:03Oct2017 Ordered   2  Butalbital-APAP-Caffeine -40 MG Oral Tablet; Take 1 tablet 3 times daily as   needed; Last Rx:03Oct2017 Ordered   3  Ondansetron 4 MG Oral Tablet Disintegrating (Zofran ODT); TAKE 4 MG Every 6 hours    Requested for: 91OVF2678; Last Rx:03Oct2017 Ordered   4  Prenatal Multi +DHA 27-0 8-228 MG Oral Capsule; Take one tablet daily; Therapy: 01WIN0659 to (Evaluate:01Jan2018)  Requested for: 24NOQ3304; Last   Rx:03Oct2017 Ordered   5   Ventolin  (90 Base) MCG/ACT Inhalation Aerosol Solution; INHALE 1 TO 2 PUFFS EVERY 4 TO 6 HOURS AS NEEDED; Therapy: 25Apr2017 to (Evaluate:20Apr2018)  Requested for: 25Apr2017; Last   Rx:25Apr2017 Ordered    Allergies    1  No Known Drug Allergies    2  No Known Environmental Allergies   3   No Known Food Allergies    Signatures   Electronically signed by : Lucas Martinez MA; Nov 10 2017  9:08AM EST                       (Author)

## 2018-01-15 NOTE — MISCELLANEOUS
Message   Recorded as Task   Date: 05/03/2017 08:59 AM, Created By: Chelsy Matos   Task Name: Follow Up   Assigned To: Tom Levy   Regarding Patient: Davonte Land, Status: In Progress   Rigo Bey - 03 May 2017 8:59 AM     TASK CREATED  Caller: Self; [phone] (Home)  pt seen in er last night said she was to follow up with dr okeefe  does she need to come in today please advise [id number] - 03 May 2017 9:34 AM     TASK IN PROGRESS   Marain Dasilva - 03 May 2017 9:35 AM     TASK EDITED  Boston Barraza she want to see this patient today? AP   Tom Levy - 03 May 2017 9:37 AM     TASK REASSIGNED: Previously Assigned To Trip Ruvalcaba - 49 May 2017 9:54 AM     TASK EDITED  I was speaking with patient as this task was sent to [facility] so I retrieved it back  Patient was checked in ER, she said everything was OK with the baby, her heart was racing so they have her wearing a halter moniter  The results are to be sent to us  She has her routine follow up scheduled  I told her if she wants to schedule something sooner we can, otherwise she is to call us if she feels she needs an appointment sooner  Active Problems    1  Asthma (493 90) (J45 909)   2  Cervical cancer screening (V76 2) (Z12 4)   3  Encounter for supervision of other normal pregnancy in first trimester (V22 1) (Z34 81)   4  Intermittent self-catheterization of bladder (V49 89) (Z78 9)   5  Migraine (346 90) (G43 909)   6  Nausea and vomiting of pregnancy, antepartum (643 93) (O21 9)   7  Urinary retention (788 20) (R33 9)   8  Urinary tract infection (599 0) (N39 0)    Current Meds   1  Diclegis 10-10 MG Oral Tablet Delayed Release; Take 2 TABs at bedtime nightly  If sx   persist, add 1 TAB each morning starting day 3  If sx persist, add 1 TAB every aftrnoon   starting day 4; Therapy: 28Apr2017 to ([facility])  Requested for: 28Apr2017; Last   Rx:28Apr2017 Ordered   2   Fioricet -40 MG TABS (Butalbital-APAP-Caffeine); Therapy: (Recorded:28Apr2017) to Recorded   3  Nitrofurantoin Macrocrystal 100 MG Oral Capsule (Macrodantin); TAKE 1 CAPSULE   EVERY 12 HOURS DAILY; Therapy: 20Apr2017 to (Evaluate:27Apr2017)  Requested for: 20Apr2017; Last   Rx:20Apr2017 Ordered   4  PreNatal [facility] CAPS; Therapy: (Recorded:30Mar2017) to Recorded   5  Ventolin  (90 Base) MCG/ACT Inhalation Aerosol Solution; INHALE 1 TO 2   PUFFS EVERY 4 TO 6 HOURS AS NEEDED; Therapy: 25Apr2017 to (Evaluate:20Apr2018)  Requested for: 25Apr2017; Last   Rx:25Apr2017 Ordered    Allergies    1  No Known Drug Allergies    2  No Known Environmental Allergies   3   No Known Food Allergies    Signatures   Electronically signed by : Yeny De Oliveira, ; May  3 2017  9:55AM EST                       (Author)

## 2018-01-15 NOTE — MISCELLANEOUS
Message   Recorded as Task   Date: 08/28/2017 08:16 AM, Created By: Vin Pena   Task Name: Call Back   Assigned To: Ulises Hernández   Regarding Patient: Treasure Robledo, Status: In Progress   Comment:    Lenka Ca - 28 Aug 2017 8:16 AM     TASK CREATED  PT CALLED STATING THAT SHE KEEPS HAVING TO LEAVE WORK EARLY DUE TO GOING TO L&D SO HER BOSS SAID THAT SHE CAN ADJUST HER HOURS TO PART-TIME 30 HRS/WK BUT NEEDS A NOTE TO DO THIS  HER CELL- [phone]   Andrew Denver - 28 Aug 2017 8:19 AM     TASK EDITED  You saw this pt last week for PNAT  She then called back to the office same day with pain complaints and you advised she be seen in triage  Do you feel we can write this requested note for pt? Thank you! Archana Neslon - 28 Aug 2017 9:18 AM     TASK EDITED  pt is stating she needs this work note faxed to her office to her attention  fax # [phone]  Jennifer Zaldivar - 91 Aug 2017 2:20 PM     TASK REPLIED TO: Previously Assigned To Jennifer Zaldivar                      note written   Andrew Denver - 28 Aug 2017 2:28 PM     TASK IN PROGRESS   Andrew Denver - 28 Aug 2017 2:34 PM     TASK EDITED  Note faxed to pt  Active Problems    1  Abdominal pain (789 00) (R10 9)   2  Asthma (493 90) (J45 909)   3  Encounter for supervision of other normal pregnancy in second trimester (V22 1)   (Z34 82)   4  Low grade squamous intraepithelial dysplasia affecting pregnancy in second trimester,   antepartum (136 02,575 1) (O99 89,R89 6)   5  Lower back pain (724 2) (M54 5)   6  Migraine (346 90) (G43 909)   7  Mild cervical dysplasia (622 11) (N87 0)   8  Nausea and vomiting of pregnancy, antepartum (643 93) (O21 9)   9  Pregnancy (V22 2) (Z34 90)    Current Meds   1  [facility] W[facility]ens Prenatal+DHA 28-0 975 & 200 MG Oral Miscellaneous; Therapy: 07RNN6691 to Recorded   2  Fioricet -40 MG TABS (Butalbital-APAP-Caffeine); Therapy: (Recorded:28Apr2017) to Recorded   3   Ondansetron 4 MG Oral Tablet Disintegrating (Zofran ODT); TAKE 4 MG Every 6 hours    Requested for: 75Hdj9393; Last Rx:90Ygn2979 Ordered   4  PreNatal [facility] CAPS; Therapy: (Recorded:30Mar2017) to Recorded   5  Prenatal Multi +DHA 27-0 8-228 MG Oral Capsule; Take one tablet daily; Therapy: 14IZR0009 to (Evaluate:58Rwz5440)  Requested for: 88TZC0041; Last   Rx:04Dei9540 Ordered   6  Ventolin  (90 Base) MCG/ACT Inhalation Aerosol Solution; INHALE 1 TO 2   PUFFS EVERY 4 TO 6 HOURS AS NEEDED; Therapy: 25Apr2017 to (Evaluate:20Apr2018)  Requested for: 08Qmd1417; Last   Rx:25Apr2017 Ordered    Allergies    1  No Known Drug Allergies    2  No Known Environmental Allergies   3   No Known Food Allergies    Signatures   Electronically signed by : Ventura Shah, ; Aug 28 2017  2:34PM EST                       (Author)

## 2018-01-15 NOTE — PROGRESS NOTES
2017         RE: Natalia Sarmiento                             To: [facility]  MR#: [id number]                                   [address]   :  [facility] [id number]   ENC: [id number]                             [place], [address]   (Exam #: [id number])                           Fax: [phone]      The LMP of this 32year old,  G2, P0-0-1-0 patient was 2017, giving   her an SHIRA of OCT 27 2017 and a current gestational age of 25 weeks 4 days   by dates  A sonographic examination was performed on 2017 using   real time equipment  The ultrasound examination was performed using   abdominal & vaginal techniques  The patient has a BMI of 24 7  Her blood   pressure today was 115/58  Earliest ultrasound found in her record: 3/30/17  10wks  10/26/17 SHIRA      Cardiac motion was observed at 132 bpm       INDICATIONS      fetal anatomical survey      Exam Types      LEVEL II   Transvaginal      RESULTS      Fetus # 1 of 1   Vertex presentation   Fetal growth appeared normal   Placenta Location = Anterior   No placenta previa   Placenta Grade = I      MEASUREMENTS (* Included In Average GA)      AC              16 6 cm        21 weeks 3 days* (64%)   BPD              5 3 cm        22 weeks 0 days* (83%)   HC              19 3 cm        21 weeks 3 days* (69%)   Femur            3 9 cm        22 weeks 4 days* (84%)      Nuchal Fold      3 6 mm   NBL              6 5 mm      Humerus          3 5 cm        22 weeks 1 day   (82%)      Cerebellum       2 4 cm        22 weeks 4 days   Biorbit          3 5 cm        22 weeks 3 days   CisternaMagna    6 1 mm      HC/AC           1 16   FL/AC           0 23   FL/BPD          0 73   EFW (Ac/Fl/Hc)   457 grams - 1 lbs 0 oz      THE AVERAGE GESTATIONAL AGE is 21 weeks 6 days +/- 10 days        AMNIOTIC FLUID         Largest Vertical Pocket = 5 1 cm   Amniotic Fluid: Normal      CERVICAL EVALUATION      SUPINE      Cervical Length: 3 50 cm      OTHER TEST RESULTS           Funneling?: No             Dynamic Changes?: No        Resp  To TFP?: No      ANATOMY      Head                                    Normal   Face/Neck                               Normal   Th  Cav  Normal   Heart                                   Normal   Abd  Cav  Normal   Stomach                                 Normal   Right Kidney                            Normal   Left Kidney                             Normal   Bladder                                 Normal   Abd  Wall                               Normal   Spine                                   Normal   Extrems                                 Normal   Genitalia                               Normal   Placenta                                Normal   Umbl  Cord                              Normal   Uterus                                  Normal   PCI                                     Normal      ANATOMY DETAILS      Visualized Appearing Sonographically Normal:   HEAD: (Calvarium, BPD Level, Cavum, Lateral Ventricles, Choroid Plexus,   Cerebellum, Cisterna Magna);    FACE/NECK: (Neck, Nuchal Fold, Profile,   Orbits, Nose/Lips, Palate, Face);    TH  CAV  : (Lungs, Diaphragm); HEART: (Four Chamber View, Proximal Left Outflow, Proximal Right Outflow,   3VV, 3 Vessel Trachea, Short Axis of Greater Vessels, Ductal Arch, Aortic   Arch, Interventricular Septum, Interatrial Septum, IVC, SVC, Cardiac Axis,   Cardiac Position);    ABD  CAV : (Liver);    STOMACH, RIGHT KIDNEY, LEFT   KIDNEY, BLADDER, ABD  WALL, SPINE: (Cervical Spine, Thoracic Spine, Lumbar   Spine, Sacrum);    EXTREMS: (Lt Humerus, Rt Humerus, Lt Forearm, Rt   Forearm, Lt Hand, Rt Hand, Lt Femur, Rt Femur, Lt Low Leg, Rt Low Leg, Lt   Foot, Rt Foot);    GENITALIA, PLACENTA, UMBL   CORD, UTERUS, PCI      ADNEXA      The left ovary appeared normal and measured 2 0 x 1 6 x 2 3 cm with a   volume of 3 8 cc  The right ovary was not visualized  IMPRESSION      [place] IUP   21 weeks and 6 days by this ultrasound  (SHIRA=OCT 21 2017)   Vertex presentation   Fetal growth appeared normal   Normal anatomy survey   Regular fetal heart rate of 132 bpm   Anterior placenta   No placenta previa      GENERAL COMMENT      On exam today the patient appears well, in no acute distress, and denies   any complaints other than some stress urinary incontinence  Her abdomen   is non-tender  The patient had Stepwise Sequential Screening at our Center reported   through [employer]  Her risk for trisomy 21 before screening was   1:930, after screening her risk is <1:5000; her risk for Trisomy 25 was   <1:5000  The open neural tube defect risk after screening is <1:5000  The fetal anatomic survey is complete  There is no sonographic evidence   of fetal abnormalities at this time  Good fetal movement and tone are   seen  The amniotic fluid volume appears normal   The placenta is anterior   and it appears sonographically normal   A transvaginal ultrasound was   performed to assess the cervix, which was not seen well transabdominally  The cervical length was 3 5 centimeters, which is normal for the current   gestational age  There was no significant funneling or dynamic changes   appreciated  The patient was informed of today's findings and all of her   questions were answered  The limitations of ultrasound were reviewed with   the patient, which she accepts  I discussed with Allie Conley that stressed urinary incontinence during   pregnancy is very common in generally not concerning long-term  We discussed follow-up in detail and I recommend Allie Conley return as   clinically indicated        Please note, in addition to the time spent discussing the results of the   ultrasound, I spent approximately 10 minutes of face-to-face time with the   patient, greater than 50% of which was spent in counseling and the   coordination of care for this patient  Thank you very much for allowing us to participate in the care of this   very nice patient  Should you have any questions, please do not hesitate   to contact our office  SHANNAN Madden M D     Electronically signed 06/16/17 17:32

## 2018-01-15 NOTE — MISCELLANEOUS
Message   Recorded as Task   Date: 09/13/2017 09:37 AM, Created By: Janey Rankin   Task Name: Call Back   Assigned To: Steph Garcias OB,Team   Regarding Patient: Timmy Jasso, Status: In Progress   Comment:    Michelle Pineda - 13 Sep 2017 9:37 AM     TASK CREATED  Patient called and spoke with the  - was c/o shortness of breath, light headedness and states her b/p was high  When it was taken it was 104/66  She is 33 weeks 2 days  Call was not transferred back  Please call pt back  BrownMarian harding - 13 Sep 2017 9:59 AM     TASK IN PROGRESS   BrownMarian - 13 Sep 2017 10:39 AM     TASK EDITED  Called patient  C/o SOB, dizziness, lightheaded, nausea, and sweating while sitting at her desk  She is hydrating well and eating small frequent meals  She states the baby moves but very sporadic  I spoke with dr Jacquelyn Alfredo and she wants to check a CBC, U/A and urine culture  Patient advised to leave work and monitor movements  Aware baby should move 10 times within a 2 hour time span  Spoke with patient  She will leave work and monitor movements and get B/W done  She is to call with any other issues  Faxed slip to Swapna Beyer - 13 Sep 2017 4:05 PM     TASK EDITED  Patient called back, she went for lab work at 1:00, had lunch and monitored her fetal movement  In two hours she only felt one movement  She then had some cold water and candy, no movement since  The sweating comes and goes, Zofran has helped the nausea  her legs feel weak when she stands too long  I spoke with Dr Selina Durán, she said to have patient come to triage  Patient and L&D were notified  Active Problems    1  Abdominal pain (789 00) (R10 9)   2  Asthma (493 90) (J45 909)   3  Dizziness (780 4) (R42)   4  Encounter for supervision of other normal pregnancy in second trimester (V22 1)   (Z34 82)   5  Lightheaded (780 4) (R42)   6   Low grade squamous intraepithelial dysplasia affecting pregnancy in second trimester, antepartum (431 84,374 8) (O99 89,R89 6)   7  Lower back pain (724 2) (M54 5)   8  Migraine (346 90) (G43 909)   9  Mild cervical dysplasia (622 11) (N87 0)   10  Nausea (787 02) (R11 0)   11  Nausea and vomiting of pregnancy, antepartum (643 93) (O21 9)   12  Pregnancy (V22 2) (Z34 90)   13  Shortness of breath (786 05) (R06 02)    Current Meds   1  Fioricet -40 MG TABS (Butalbital-APAP-Caffeine); Therapy: (Recorded:28Apr2017) to Recorded   2  Ondansetron 4 MG Oral Tablet Disintegrating (Zofran ODT); TAKE 4 MG Every 6 hours    Requested for: 41Les5259; Last Rx:38Fbg7331 Ordered   3  Prenatal Multi +DHA 27-0 8-228 MG Oral Capsule; Take one tablet daily; Therapy: 42NOK2426 to (Evaluate:43Psw6696)  Requested for: 33MPU4889; Last   Rx:27Hmy7514 Ordered   4  Ventolin  (90 Base) MCG/ACT Inhalation Aerosol Solution; INHALE 1 TO 2   PUFFS EVERY 4 TO 6 HOURS AS NEEDED; Therapy: 25Apr2017 to (Evaluate:20Apr2018)  Requested for: 96Ivv9105; Last   Rx:08Isv8156 Ordered    Allergies    1  No Known Drug Allergies    2  No Known Environmental Allergies   3   No Known Food Allergies    Signatures   Electronically signed by : David Panda, ; Sep 13 2017  4:05PM EST                       (Author)

## 2018-01-16 NOTE — MISCELLANEOUS
Message  Pt called office  She is 14wks pregnant  Was very tearful - reports severe lower left sided abdominal pains  States it is an 8/10 and is not relieved by anything she has tried  Pt also feels dizzy, has a racing HR and feels tightness in her chest  Pt states she feels she needs to be seen in the Emergency Room  She was directed to [facility] - was put on patient access check in list       Active Problems    1  Asthma (493 90) (J45 909)   2  Cervical cancer screening (V76 2) (Z12 4)   3  Encounter for supervision of other normal pregnancy in first trimester (V22 1) (Z34 81)   4  Intermittent self-catheterization of bladder (V49 89) (Z78 9)   5  Migraine (346 90) (G43 909)   6  Nausea and vomiting of pregnancy, antepartum (643 93) (O21 9)   7  Urinary retention (788 20) (R33 9)   8  Urinary tract infection (599 0) (N39 0)    Current Meds   1  Diclegis 10-10 MG Oral Tablet Delayed Release; Take 2 TABs at bedtime nightly  If sx   persist, add 1 TAB each morning starting day 3  If sx persist, add 1 TAB every aftrnoon   starting day 4; Therapy: 28Apr2017 to ([facility])  Requested for: 28Apr2017; Last   Rx:28Apr2017 Ordered   2  Fioricet -40 MG TABS (Butalbital-APAP-Caffeine); Therapy: (Recorded:28Apr2017) to Recorded   3  Nitrofurantoin Macrocrystal 100 MG Oral Capsule (Macrodantin); TAKE 1 CAPSULE   EVERY 12 HOURS DAILY; Therapy: 20Apr2017 to (Evaluate:27Apr2017)  Requested for: 20Apr2017; Last   Rx:20Apr2017 Ordered   4  PreNatal [facility] CAPS; Therapy: (Recorded:30Mar2017) to Recorded   5  Ventolin  (90 Base) MCG/ACT Inhalation Aerosol Solution; INHALE 1 TO 2   PUFFS EVERY 4 TO 6 HOURS AS NEEDED; Therapy: 25Apr2017 to (Evaluate:20Apr2018)  Requested for: 25Apr2017; Last   Rx:25Apr2017 Ordered    Allergies    1  No Known Drug Allergies    2  No Known Environmental Allergies   3   No Known Food Allergies    Signatures   Electronically signed by : Dusty House, ; May  1 2017  1:56PM EST (Author)

## 2018-01-16 NOTE — MISCELLANEOUS
Message   Recorded as Task   Date: 08/28/2017 08:16 AM, Created By: Keli Art   Task Name: Call Back   Assigned To: Jonathan Davonteyves   Regarding Patient: Adan Barrow, Status: Active   Comment:    RobyLenka - 28 Aug 2017 8:16 AM     TASK CREATED  PT CALLED STATING THAT SHE KEEPS HAVING TO LEAVE WORK EARLY DUE TO GOING TO L&D SO HER BOSS SAID THAT SHE CAN ADJUST HER HOURS TO PART-TIME 30 HRS/WK BUT NEEDS A NOTE TO DO THIS  HER CELL- [phone]   [place] - 28 Aug 2017 8:19 AM     TASK EDITED  You saw this pt last week for PNAT  She then called back to the office same day with pain complaints and you advised she be seen in triage  Do you feel we can write this requested note for pt? Thank you! Archana Nelson - 28 Aug 2017 9:18 AM     TASK EDITED  pt is stating she needs this work note faxed to her office to her attention   fax # [phone]    note written to work no more than 30 hours weekly      Signatures   Electronically signed by : KESHA Henao ; Aug 28 2017  2:20PM EST                       (Author)

## 2018-01-17 NOTE — MISCELLANEOUS
Message  Return to work or school:        Due to pregnancy conditions, patient is advised to limit work to 30 hours weekly          Signatures   Electronically signed by : KESHA Hightower ; Aug 28 2017  2:19PM EST                       (Author)

## 2018-01-18 NOTE — MISCELLANEOUS
Message   Recorded as Task   Date: 09/13/2017 09:37 AM, Created By: Emma Arroyo   Task Name: Call Back   Assigned To: Najma Reynolds OB,Team   Regarding Patient: Tyson Franco, Status: In Progress   Comment:    Michelle Pineda - 13 Sep 2017 9:37 AM     TASK CREATED  Patient called and spoke with the  - was c/o shortness of breath, light headedness and states her b/p was high  When it was taken it was 104/66  She is 33 weeks 2 days  Call was not transferred back  Please call pt back  Marian Dasilva - 13 Sep 2017 9:59 AM     TASK IN PROGRESS   BrownMarian - 13 Sep 2017 10:39 AM     TASK EDITED  Called patient  C/o SOB, dizziness, lightheaded, nausea, and sweating while sitting at her desk  She is hydrating well and eating small frequent meals  She states the baby moves but very sporadic  I spoke with dr Raji Dacosta and she wants to check a CBC, U/A and urine culture  Patient advised to leave work and monitor movements  Aware baby should move 10 times within a 2 hour time span  Spoke with patient  She will leave work and monitor movements and get B/W done  She is to call with any other issues  Faxed slip to [facility]      AP        Active Problems    1  Abdominal pain (789 00) (R10 9)   2  Asthma (493 90) (J45 909)   3  Dizziness (780 4) (R42)   4  Encounter for supervision of other normal pregnancy in second trimester (V22 1)   (Z34 82)   5  Lightheaded (780 4) (R42)   6  Low grade squamous intraepithelial dysplasia affecting pregnancy in second trimester,   antepartum (387 28,512 2) (O99 89,R89 6)   7  Lower back pain (724 2) (M54 5)   8  Migraine (346 90) (G43 909)   9  Mild cervical dysplasia (622 11) (N87 0)   10  Nausea (787 02) (R11 0)   11  Nausea and vomiting of pregnancy, antepartum (643 93) (O21 9)   12  Pregnancy (V22 2) (Z34 90)   13  Shortness of breath (786 05) (R06 02)    Current Meds   1  Fioricet -40 MG TABS (Butalbital-APAP-Caffeine);    Therapy: (Recorded:28Apr2017) to Recorded 2  Ondansetron 4 MG Oral Tablet Disintegrating (Zofran ODT); TAKE 4 MG Every 6 hours    Requested for: 56Zwm8195; Last Rx:74Cge7103 Ordered   3  Prenatal Multi +DHA 27-0 8-228 MG Oral Capsule; Take one tablet daily; Therapy: 26RUC7665 to (Evaluate:98Erz7149)  Requested for: 21PWS4863; Last   Rx:53Zlk4704 Ordered   4  Ventolin  (90 Base) MCG/ACT Inhalation Aerosol Solution; INHALE 1 TO 2   PUFFS EVERY 4 TO 6 HOURS AS NEEDED; Therapy: 72Ynz6357 to (Evaluate:20Apr2018)  Requested for: 59Oxv4153; Last   Rx:57Ues4656 Ordered    Allergies    1  No Known Drug Allergies    2  No Known Environmental Allergies   3  No Known Food Allergies    Plan  Dizziness, Lightheaded, Nausea, Pregnancy, Shortness of breath    · (1) URINALYSIS (will reflex a microscopy if leukocytes, occult blood, protein or nitrites  are not within normal limits); Status:Active - Retrospective Authorization; Requested  for:78Lzs1201;    · (1) URINE CULTURE; Source:Urine, Clean Catch; Status:Active - Retrospective  Authorization; Requested for:52Akj7436;    · (Q) CBC (INCLUDES DIFF/PLT) (REFL); Status:Active - Retrospective Authorization; Requested for:15Yjc2305;     Signatures   Electronically signed by :  Rae Napoles, ; Sep 13 2017 10:39AM EST                       (Author)

## 2018-01-18 NOTE — RESULT NOTES
Verified Results  (Q) STEPWISE, PART 1 [id number] 07:53AM Jose F Rodriguez     Test Name Result Flag Reference   INTERPRETATION SEE NOTE     This patient's risk does not exceed the first trimester  cut-off for Down syndrome or trisomy 18  The integrated  screen calculation is awaiting the second trimester sample  NT WAS USED IN THE RISK CALCULATIONS  Thank you for submitting this patient's Part 1 specimen  These first trimester values will be incorporated with the  second trimester values as part of the integrated testing  process  Please submit the Part 2 specimen between   05/04/2017-06/28/2017 (15 0 and 22 9 weeks gestation) with   05/04/2017-05/17/2017 (15 0 - 16 9 weeks gestation) being  optimal  When submitting Part 2, please include the  following Specimen # from Part 1:  [id number]   AGE RISK DOWN SYNDROME 1:700     MIGUELITO DOWN SYNDROME RISK 1:2000  <1:50   RISK FOR TRISOMY 18 <1:5000  <1:100   CALCULATED GESTATIONAL$AGE 13 1     Crown rump length (CRL) was used to calculate gestational  age  SHIRA, if provided, was not used for gestational age  dating  PAVAN-A 714 8 ng/mL     This test was performed using a kit that has not been  cleared or approved by the FDA  The analytical performance  characteristics of this test have been determined by [employer]  This test  should not be used for diagnosis without confirmation by  other medically established means  PAVAN-A MOM 0 49     HCG 77 8 IU/mL     HCG MOM 0 97     NT MOM 1 22     The maternal serum screening results indicate a lower risk  of trisomy 21 in this pregnancy  The nasal bone was assessed  via ultrasound and was present  The combined risk is  therefore likely to be less than the calculated risk  Other  findings later in the pregnancy may change the risk  Nasal bone assessment is best accomplished through a fetal  ultrasound performed between 11 weeks 0 days through 13  weeks 6 days   In assessing the risk for aneuploidy, the  evaluation of the maternal serum markers plus the nuchal  thickness measurement is calculated first  Any potential  change to the patient's risk for aneuploidy depends on the  nuchal thickness, crown-rump length, and the ethnic origin,  and therefore the values generated by the algorithm itself  will not change  Additional information about the assessment  of the fetal nasal bone may be found on the [facility] website at  http://www  fetalmedicine com/fmf/training-certification/cert  syniwobl-il-dkjjy  henrry/11-13-week-scan/assessment-of-the-nasal-bone/  Please note that the Sequential Integrated maternal serum  screen for Down syndrome risk assessment was designed by Dr Alexa Addison ([address], et al J Med Screen 2003 v10 p56-104)  to provide a high detection rate and low false positive rate  when a cutoff of 1:50 is used to identify high risk  pregnancies during the first trimester  Use of any other  cutoff for determination of risk in the first trimester will  result in a higher false positive rate for the two-part  screen  All patients whose risk is lower than 1:50 should  have a second sample submitted to complete the screen  This is a screening test, not a diagnostic test      This risk assessment is based on demographic data provided  by the ordering physician  Please notify the laboratory  promptly if any data are incorrect  If you have questions concerning this report: For clinical consultation, call [phone]; For technical questions, call [phone] ext [phone]; For recalculations, fax to [phone]  For additional information, please refer to  http://"[employer]/faq/FAQ85  (This link is provided for informational/educational  purposes only )   REFERRING PHYSICIAN NAME [address] [place] [address] PHONE [phone]     REFERRING PHYSICIAN NPI [id number]     DATE OF BIRTH 1990     COLLECTION DATE 04/21/2017     ULTRASOUND DATE 04/20/2017     ULTRASONOGRAPHER'S NAME ROBERT ALEMAN     NTQR ULTRASONOGRAPHER ID# [id number]     NTQR LOCATION ID# NOT GIVEN     NTQR READING PHYS ID# [id number]     FMF ULTRASONOGRAPHER ID# NOT GIVEN     CROWN RUMP LENGTH 70 mm     NUCHAL TRANSLUCENCY 1 9 mm     IF TWINS NOT GIVEN     TWIN B CRL NOT GIVEN mm     TWIN B NT NOT GIVEN mm     MATERNAL WEIGHT 123 lbs     EST'D DATE OF DELIVERY 10/30/2017     SHIRA DETERMINED BY LMP     MOTHER'S ETHNIC ORIGIN      NUMBER OF FETUSES 1     INSULIN DEPEND DIABETIC NO     REPEAT SPECIMEN NO     HX OF NEURAL TUBE DEFECTS NO     PREV PREG DOWN SYND NO     DONOR EGG NO     DONOR EGG; EGG RETRIEVAL NOT GIVEN     Nasal Bone PRESENT     Twin B Nasal Bone NOT GIVEN

## 2018-01-18 NOTE — MISCELLANEOUS
Message   Recorded as Task   Date: 06/02/2017 03:19 PM, Created By: Isacc Jefferson   Task Name: Call Back   Assigned To: Simone Caldweller OB,Team   Regarding Patient: Venkatesh Tejeda, Status: In Progress   NicRadha Pastrana - 02 Jun 2017 3:19 PM     TASK CREATED  Caller: Self; [phone] (Home)  pt thinks she has a yeast inf has used otc but its not helping call her at [phone]   Moisés Valdez - 02 Jun 2017 3:28 PM     TASK IN PROGRESS   Moisés Valdez - 02 Jun 2017 3:36 PM     TASK REPLIED TO: Previously Assigned To [address] with pt  After seeing CAT pt used 3 day Monistat as instructed by Dr Kate Powell sx improved and discharge has resolved  She continues to feel chapped and dry externally which is causing irritation  Advised OTC hydrocortisone BID over the weekend  If sx persist by Monday pt will need an apt  Active Problems    1  Asthma (493 90) (J45 909)   2  Cervical cancer screening (V76 2) (Z12 4)   3  Cough variant asthma (493 82) (J45 991)   4  Encounter for supervision of other normal pregnancy in first trimester (V22 1) (Z34 81)   5  Intermittent self-catheterization of bladder (V49 89) (Z78 9)   6  Low grade squamous intraepithelial dysplasia affecting pregnancy in second trimester,   antepartum (333 92,048 4) (O34 42,R87 612)   7  Low grade squamous intraepithelial lesion (LGSIL) at risk for high grade squamous   intraepithelial lesion (HGSIL) on cytologic smear of cervix (795 03) (T62 162)   8  Migraine (346 90) (G43 909)   9  Nausea and vomiting of pregnancy, antepartum (643 93) (O21 9)   10  Urinary retention (788 20) (R33 9)   11  Urinary tract infection (599 0) (N39 0)    Current Meds   1  Fioricet -40 MG TABS (Butalbital-APAP-Caffeine); Therapy: (Recorded:28Apr2017) to Recorded   2  Montelukast Sodium 10 MG Oral Tablet (Singulair); TAKE 1 TABLET DAILY AS   DIRECTED;    Therapy: 91RXC4083 to ([address])  Requested for: 98TST8695; Last   EF:72EEA3558 Ordered   3  PreNatal [facility] CAPS; Therapy: (Recorded:30Mar2017) to Recorded   4  Ventolin  (90 Base) MCG/ACT Inhalation Aerosol Solution; INHALE 1 TO 2   PUFFS EVERY 4 TO 6 HOURS AS NEEDED; Therapy: 25Apr2017 to (Evaluate:20Apr2018)  Requested for: 25Apr2017; Last   Rx:25Apr2017 Ordered   5  Zithromax Z-Bertram 250 MG Oral Tablet (Azithromycin); TAKE AS DIRECTED; Therapy: 63FAK7458 to (Last Rx:01Jun2017) Ordered   6  Zofran 4 MG Oral Tablet (Ondansetron HCl); Therapy: (Recorded:01Jun2017) to Recorded    Allergies    1  No Known Drug Allergies    2  No Known Environmental Allergies   3   No Known Food Allergies    Signatures   Electronically signed by : Jose Tapia, ; Jun 2 2017  3:36PM EST                       (Author)

## 2018-01-22 VITALS
DIASTOLIC BLOOD PRESSURE: 60 MMHG | WEIGHT: 141 LBS | SYSTOLIC BLOOD PRESSURE: 98 MMHG | HEIGHT: 61 IN | BODY MASS INDEX: 26.62 KG/M2

## 2018-01-22 VITALS — DIASTOLIC BLOOD PRESSURE: 60 MMHG | SYSTOLIC BLOOD PRESSURE: 108 MMHG | WEIGHT: 146 LBS | BODY MASS INDEX: 27.59 KG/M2

## 2018-01-22 VITALS
SYSTOLIC BLOOD PRESSURE: 110 MMHG | HEIGHT: 62 IN | DIASTOLIC BLOOD PRESSURE: 64 MMHG | BODY MASS INDEX: 22.33 KG/M2 | WEIGHT: 121.31 LBS

## 2018-01-22 VITALS
WEIGHT: 134 LBS | HEIGHT: 61 IN | BODY MASS INDEX: 25.3 KG/M2 | SYSTOLIC BLOOD PRESSURE: 110 MMHG | DIASTOLIC BLOOD PRESSURE: 60 MMHG

## 2018-01-22 VITALS
WEIGHT: 130 LBS | HEIGHT: 61 IN | SYSTOLIC BLOOD PRESSURE: 102 MMHG | DIASTOLIC BLOOD PRESSURE: 56 MMHG | BODY MASS INDEX: 24.55 KG/M2

## 2018-01-22 VITALS
HEIGHT: 61 IN | WEIGHT: 147 LBS | BODY MASS INDEX: 27.75 KG/M2 | DIASTOLIC BLOOD PRESSURE: 70 MMHG | SYSTOLIC BLOOD PRESSURE: 120 MMHG

## 2018-01-22 VITALS
SYSTOLIC BLOOD PRESSURE: 110 MMHG | DIASTOLIC BLOOD PRESSURE: 62 MMHG | BODY MASS INDEX: 27.94 KG/M2 | HEIGHT: 61 IN | WEIGHT: 148 LBS

## 2018-01-22 VITALS
SYSTOLIC BLOOD PRESSURE: 118 MMHG | WEIGHT: 150 LBS | BODY MASS INDEX: 28.32 KG/M2 | DIASTOLIC BLOOD PRESSURE: 74 MMHG | HEIGHT: 61 IN

## 2018-01-22 VITALS — WEIGHT: 139 LBS | SYSTOLIC BLOOD PRESSURE: 110 MMHG | BODY MASS INDEX: 26.26 KG/M2 | DIASTOLIC BLOOD PRESSURE: 74 MMHG

## 2018-01-22 VITALS
BODY MASS INDEX: 22.45 KG/M2 | HEIGHT: 62 IN | SYSTOLIC BLOOD PRESSURE: 102 MMHG | DIASTOLIC BLOOD PRESSURE: 60 MMHG | WEIGHT: 122 LBS

## 2018-01-22 VITALS
HEART RATE: 90 BPM | SYSTOLIC BLOOD PRESSURE: 104 MMHG | OXYGEN SATURATION: 96 % | WEIGHT: 120.6 LBS | BODY MASS INDEX: 22.19 KG/M2 | HEIGHT: 62 IN | DIASTOLIC BLOOD PRESSURE: 60 MMHG | RESPIRATION RATE: 16 BRPM

## 2018-01-23 NOTE — MISCELLANEOUS
Message   Recorded as Task   Date: 12/08/2017 11:09 AM, Created By: Qing Peñaloza   Task Name: Call Back   Assigned To: Ed Gamez OB,Team   Regarding Patient: Martina Aj, Status: In Progress   CommentIvor Rosemary - 08 Dec 2017 11:09 AM     TASK CREATED  Caller: Self; Results Inquiry; [phone] (Home)  patient is calling for lab results from 12/7 [facility] 75  - 08 Dec 2017 11:46 AM     TASK IN PROGRESS   GaurangLayo - 08 Dec 2017 11:48 AM     TASK EDITED  called pt- L/M for pt to return call prior to 345pm today  ([facility] lab results available in allscripts)   CanelaElaine alejo - 08 Dec 2017 12:06 PM     TASK EDITED  pt returning your call please advise   Qing Peñaloza - 08 Dec 2017 1:08 PM     TASK EDITED  left message to return my call   I left another message, patient did not call back  Active Problems    1  Abdominal pain (789 00) (R10 9)   2  Asthma (493 90) (J45 909)   3  Dizziness (780 4) (R42)   4  Encounter for supervision of other normal pregnancy in third trimester (V22 1) (Z34 83)   5  Encounter for supervision of other normal pregnancy, third trimester (V22 1) (Z34 83)   6  GBS (group B Streptococcus carrier), +RV culture, currently pregnant (V23 89,V02 51)   (O99 820)   7  Heavy menstrual period (626 2) (N92 0)   8  Lightheaded (780 4) (R42)   9  Low grade squamous intraepithelial dysplasia affecting pregnancy in second trimester,   antepartum (608 72,687 9) (O99 89,R89 6)   10  Lower back pain (724 2) (M54 5)   11  Menometrorrhagia (626 2) (N92 1)   12  Migraine (346 90) (G43 909)   13  Mild cervical dysplasia (622 11) (N87 0)   14  Nausea and vomiting of pregnancy, antepartum (643 93) (O21 9)   15  Pregnancy (V22 2) (Z34 90)   16  Shortness of breath (786 05) (R06 02)    Current Meds   1  Butalbital-APAP-Caffeine -40 MG Oral Tablet; Take 1 tablet 3 times daily as   needed; Last Rx:54Fck9344 Ordered   2   NuvaRing 0 12-0 015 MG/24HR Vaginal Ring; INSERT 1 RING VAGINALLY FOR 3   WEEKS THEN 1 WEEK OFF; Therapy: 21AVJ5881 to (Evaluate:20Oct2018)  Requested for: 35FHQ4526; Last   Rx:30Nov2017 Ordered   3  Ondansetron 4 MG Oral Tablet Disintegrating (Zofran ODT); TAKE 4 MG Every 6 hours    Requested for: 02AJK9907; Last Rx:03Oct2017 Ordered   4  Ortho-Novum 1/35 (28) 1-35 MG-MCG Oral Tablet; Take one (1) tablet(s) daily for 21   days   then 7 tablet-free days  Repeat; Therapy: 34NIM3155 to (VSDIAVTJ:07XJZ4885)  Requested for: 48ZSZ3335; Last   Rx:61Fcp1526 Ordered   5  Prenatal Multi +DHA 27-0 8-228 MG Oral Capsule; Take one tablet daily; Therapy: 71OZJ0404 to (Evaluate:01Jan2018)  Requested for: 10JLF8560; Last   Rx:03Oct2017 Ordered   6  Ventolin  (90 Base) MCG/ACT Inhalation Aerosol Solution; INHALE 1 TO 2   PUFFS EVERY 4 TO 6 HOURS AS NEEDED; Therapy: 25Apr2017 to (Evaluate:20Apr2018)  Requested for: 25Apr2017; Last   Rx:25Apr2017 Ordered    Allergies    1  No Known Drug Allergies    2  No Known Environmental Allergies   3   No Known Food Allergies    Signatures   Electronically signed by : Cale Sharma, ; Dec 11 2017  3:13PM EST                       (Author)

## 2018-01-23 NOTE — MISCELLANEOUS
Message  Patient called with bleeding, heavy, since Friday  Urine pregnancy test negative  Patient states that her NuvaRing is in place  Patient denies feeling faint  Recommended plan of care: 1  CBC and TSH, Fax to [facility] on [address]  2  Add monophasic birth control pill for 1 month In addition to the NuvaRing, Just one pill a day  Start iron supplementation  Drink 1 gallon of water per day  Patient needs a follow-up appointment in office  We will recommend Mirena IUD  Plan  Heavy menstrual period    · (1) CBC/PLT/DIFF; Status:Active - Retrospective By Protocol Authorization; Requested  for:52Sxa8281;    · (1) TSH WITH FT4 REFLEX; Status:Active - Retrospective Authorization; Requested  for:69Uif6071;   Menometrorrhagia    · Ortho-Novum 1/35 (28) 1-35 MG-MCG Oral Tablet; Take one (1) tablet(s) daily for  21 days   then 7 tablet-free days  Repeat  Migraine    · Butalbital-APAP-Caffeine -40 MG Oral Tablet;  Take 1 tablet 3 times daily  as needed    Signatures   Electronically signed by : KESHA Teixeira ; Dec  6 2017  4:32PM EST                       (Author)

## 2018-01-23 NOTE — MISCELLANEOUS
Message   Recorded as Task   Date: 12/08/2017 11:09 AM, Created By: Amy Carbajal   Task Name: Call Back   Assigned To: Georgina Arboleda OB,Team   Regarding Patient: Sandra Antonio, Status: In Progress   Shabbir Mcdaniela - 08 Dec 2017 11:09 AM     TASK CREATED  Caller: Self; Results Inquiry; [phone] (Home)  patient is calling for lab results from 12/7 [facility] 75  - 08 Dec 2017 11:46 AM     TASK IN PROGRESS   GaurangLayo - 08 Dec 2017 11:48 AM     TASK EDITED  called pt- L/M for pt to return call prior to 345pm today  ([facility] lab results available in allscripts)        Active Problems    1  Abdominal pain (789 00) (R10 9)   2  Asthma (493 90) (J45 909)   3  Dizziness (780 4) (R42)   4  Encounter for supervision of other normal pregnancy in third trimester (V22 1) (Z34 83)   5  Encounter for supervision of other normal pregnancy, third trimester (V22 1) (Z34 83)   6  GBS (group B Streptococcus carrier), +RV culture, currently pregnant (V23 89,V02 51)   (O99 820)   7  Heavy menstrual period (626 2) (N92 0)   8  Lightheaded (780 4) (R42)   9  Low grade squamous intraepithelial dysplasia affecting pregnancy in second trimester,   antepartum (910 40,094 6) (O99 89,R89 6)   10  Lower back pain (724 2) (M54 5)   11  Menometrorrhagia (626 2) (N92 1)   12  Migraine (346 90) (G43 909)   13  Mild cervical dysplasia (622 11) (N87 0)   14  Nausea and vomiting of pregnancy, antepartum (643 93) (O21 9)   15  Pregnancy (V22 2) (Z34 90)   16  Shortness of breath (786 05) (R06 02)    Current Meds   1  Butalbital-APAP-Caffeine -40 MG Oral Tablet; Take 1 tablet 3 times daily as   needed; Last Rx:99Maz5949 Ordered   2  NuvaRing 0 12-0 015 MG/24HR Vaginal Ring; INSERT 1 RING VAGINALLY FOR 3   WEEKS THEN 1 WEEK OFF; Therapy: 33KRC6196 to (Evaluate:43Zna0958)  Requested for: 11LMH0949; Last   Rx:30Nov2017 Ordered   3   Ondansetron 4 MG Oral Tablet Disintegrating (Zofran ODT); TAKE 4 MG Every 6 hours    Requested for: 80QOY6236; Last Rx:03Oct2017 Ordered   4  Ortho-Novum 1/35 (28) 1-35 MG-MCG Oral Tablet; Take one (1) tablet(s) daily for 21   days   then 7 tablet-free days  Repeat; Therapy: 99FKP0065 to (GHOZXKZT:61LLC1219)  Requested for: 75JJL6136; Last   Rx:61Vkj3547 Ordered   5  Prenatal Multi +DHA 27-0 8-228 MG Oral Capsule; Take one tablet daily; Therapy: 87OHR9769 to (Evaluate:01Jan2018)  Requested for: 98VVP9075; Last   Rx:03Oct2017 Ordered   6  Ventolin  (90 Base) MCG/ACT Inhalation Aerosol Solution; INHALE 1 TO 2   PUFFS EVERY 4 TO 6 HOURS AS NEEDED; Therapy: 25Apr2017 to (Evaluate:20Apr2018)  Requested for: 25Apr2017; Last   Rx:25Apr2017 Ordered    Allergies    1  No Known Drug Allergies    2  No Known Environmental Allergies   3   No Known Food Allergies    Signatures   Electronically signed by : Neftali Albrecht RN; Dec  8 2017 11:48AM EST                       (Author)

## 2018-01-26 ENCOUNTER — TRANSCRIBE ORDERS (OUTPATIENT)
Dept: URGENT CARE | Facility: CLINIC | Age: 28
End: 2018-01-26

## 2018-01-26 ENCOUNTER — OFFICE VISIT (OUTPATIENT)
Dept: URGENT CARE | Facility: CLINIC | Age: 28
End: 2018-01-26
Payer: COMMERCIAL

## 2018-01-26 VITALS
RESPIRATION RATE: 16 BRPM | OXYGEN SATURATION: 99 % | HEIGHT: 61 IN | BODY MASS INDEX: 23.6 KG/M2 | SYSTOLIC BLOOD PRESSURE: 108 MMHG | TEMPERATURE: 97.6 F | HEART RATE: 98 BPM | WEIGHT: 125 LBS | DIASTOLIC BLOOD PRESSURE: 68 MMHG

## 2018-01-26 DIAGNOSIS — G43.709 CHRONIC MIGRAINE WITHOUT AURA WITHOUT STATUS MIGRAINOSUS, NOT INTRACTABLE: ICD-10-CM

## 2018-01-26 DIAGNOSIS — J06.9 VIRAL UPPER RESPIRATORY TRACT INFECTION: Primary | ICD-10-CM

## 2018-01-26 PROCEDURE — 99283 EMERGENCY DEPT VISIT LOW MDM: CPT | Performed by: PREVENTIVE MEDICINE

## 2018-01-26 PROCEDURE — G0382 LEV 3 HOSP TYPE B ED VISIT: HCPCS | Performed by: PREVENTIVE MEDICINE

## 2018-01-26 RX ORDER — BUTALBITAL, ACETAMINOPHEN AND CAFFEINE 50; 325; 40 MG/1; MG/1; MG/1
1 TABLET ORAL DAILY PRN
Qty: 10 TABLET | Refills: 0
Start: 2018-01-26 | End: 2018-02-05 | Stop reason: ALTCHOICE

## 2018-01-26 NOTE — PROGRESS NOTES
Assessment/Plan:      Diagnoses and all orders for this visit:    Viral upper respiratory tract infection    Chronic migraine without aura without status migrainosus, not intractable  -     butalbital-acetaminophen-caffeine (FIORICET,ESGIC) -40 mg per tablet; Take 1 tablet by mouth daily as needed for headaches          Subjective:     Patient ID: Carol Yanez is a 32 y o  female  3-4 days of mild achiness and the feeling that she is coming now with a head cold  She just wants to be checked as she does not want her daughter to get this illness        Headache          Review of Systems   Constitutional: Positive for fatigue  HENT: Negative  Respiratory: Negative  Neurological: Positive for headaches  Objective:     Physical Exam   HENT:   Right Ear: External ear normal    Left Ear: External ear normal    Mouth/Throat: Oropharynx is clear and moist    Eyes: Conjunctivae are normal    Cardiovascular: Regular rhythm and normal heart sounds  Pulmonary/Chest: Breath sounds normal    Lymphadenopathy:     She has no cervical adenopathy

## 2018-01-26 NOTE — PROGRESS NOTES
Assessment/Plan:      Diagnoses and all orders for this visit:    Viral upper respiratory tract infection    Chronic migraine without aura without status migrainosus, not intractable          Subjective:     Patient ID: Kolby Collins is a 32 y o  female      HPI    Review of Systems      Objective:     Physical Exam

## 2018-01-29 RX ORDER — TOBRAMYCIN 3 MG/ML
SOLUTION/ DROPS OPHTHALMIC
COMMUNITY
Start: 2017-11-27 | End: 2018-01-30 | Stop reason: ALTCHOICE

## 2018-01-29 RX ORDER — BUTALBITAL, ACETAMINOPHEN AND CAFFEINE 300; 40; 50 MG/1; MG/1; MG/1
CAPSULE ORAL EVERY 4 HOURS
COMMUNITY
Start: 2017-10-03 | End: 2018-01-30 | Stop reason: SDUPTHER

## 2018-01-29 RX ORDER — CLOBETASOL PROPIONATE 0.5 MG/G
CREAM TOPICAL
Refills: 0 | COMMUNITY
Start: 2017-12-06 | End: 2018-01-30 | Stop reason: SDUPTHER

## 2018-01-29 RX ORDER — MONTELUKAST SODIUM 10 MG/1
10 TABLET ORAL DAILY
Refills: 1 | COMMUNITY
Start: 2017-11-27 | End: 2018-05-25 | Stop reason: ALTCHOICE

## 2018-01-29 RX ORDER — ALBUTEROL SULFATE 90 UG/1
2 AEROSOL, METERED RESPIRATORY (INHALATION) EVERY 4 HOURS
COMMUNITY
Start: 2017-01-18 | End: 2018-01-30 | Stop reason: SDUPTHER

## 2018-01-29 RX ORDER — NORETHINDRONE AND ETHINYL ESTRADIOL 1 MG-35MCG
KIT ORAL
Refills: 0 | COMMUNITY
Start: 2017-12-06 | End: 2018-01-30 | Stop reason: ALTCHOICE

## 2018-01-29 RX ORDER — ETONOGESTREL/ETHINYL ESTRADIOL .12-.015MG
RING, VAGINAL VAGINAL
Refills: 11 | COMMUNITY
Start: 2018-01-20 | End: 2018-04-17

## 2018-01-29 RX ORDER — CLOBETASOL PROPIONATE 0.5 MG/G
CREAM TOPICAL
COMMUNITY
Start: 2017-12-06 | End: 2018-07-17 | Stop reason: SDUPTHER

## 2018-01-29 RX ORDER — ONDANSETRON 4 MG/1
4 TABLET, ORALLY DISINTEGRATING ORAL EVERY 6 HOURS
COMMUNITY
End: 2018-01-30 | Stop reason: ALTCHOICE

## 2018-01-29 RX ORDER — CHOLECALCIFEROL (VITAMIN D3) 25 MCG
1 TABLET,CHEWABLE ORAL DAILY
COMMUNITY
Start: 2017-07-25 | End: 2018-04-17 | Stop reason: ALTCHOICE

## 2018-01-29 RX ORDER — BUTALBITAL, ACETAMINOPHEN AND CAFFEINE 50; 325; 40 MG/1; MG/1; MG/1
1 TABLET ORAL 3 TIMES DAILY PRN
COMMUNITY
End: 2018-02-05 | Stop reason: ALTCHOICE

## 2018-01-30 ENCOUNTER — OFFICE VISIT (OUTPATIENT)
Dept: INTERNAL MEDICINE CLINIC | Facility: CLINIC | Age: 28
End: 2018-01-30
Payer: COMMERCIAL

## 2018-01-30 VITALS
TEMPERATURE: 98.4 F | HEIGHT: 63 IN | WEIGHT: 123.46 LBS | HEART RATE: 84 BPM | SYSTOLIC BLOOD PRESSURE: 100 MMHG | BODY MASS INDEX: 21.88 KG/M2 | DIASTOLIC BLOOD PRESSURE: 76 MMHG

## 2018-01-30 DIAGNOSIS — J45.20 MILD INTERMITTENT ASTHMA WITHOUT COMPLICATION: ICD-10-CM

## 2018-01-30 DIAGNOSIS — F33.2 SEVERE EPISODE OF RECURRENT MAJOR DEPRESSIVE DISORDER, WITHOUT PSYCHOTIC FEATURES (HCC): ICD-10-CM

## 2018-01-30 DIAGNOSIS — F41.9 ANXIETY: ICD-10-CM

## 2018-01-30 DIAGNOSIS — G43.709 CHRONIC MIGRAINE WITHOUT AURA WITHOUT STATUS MIGRAINOSUS, NOT INTRACTABLE: Primary | ICD-10-CM

## 2018-01-30 PROCEDURE — 99203 OFFICE O/P NEW LOW 30 MIN: CPT | Performed by: INTERNAL MEDICINE

## 2018-01-30 PROCEDURE — 3725F SCREEN DEPRESSION PERFORMED: CPT | Performed by: INTERNAL MEDICINE

## 2018-01-30 RX ORDER — BUTALBITAL, ACETAMINOPHEN AND CAFFEINE 50; 325; 40 MG/1; MG/1; MG/1
1 TABLET ORAL DAILY PRN
Qty: 10 TABLET | Refills: 0 | Status: CANCELLED
Start: 2018-01-30

## 2018-01-30 RX ORDER — TOPIRAMATE 25 MG/1
50 TABLET ORAL 2 TIMES DAILY
Qty: 60 TABLET | Refills: 5 | Status: SHIPPED | OUTPATIENT
Start: 2018-01-30 | End: 2018-04-17 | Stop reason: ALTCHOICE

## 2018-01-30 RX ORDER — FLUOXETINE HYDROCHLORIDE 20 MG/1
20 CAPSULE ORAL DAILY
Qty: 30 CAPSULE | Refills: 1 | Status: SHIPPED | OUTPATIENT
Start: 2018-01-30 | End: 2018-04-17 | Stop reason: ALTCHOICE

## 2018-01-30 RX ORDER — FLUOXETINE HYDROCHLORIDE 20 MG/1
20 CAPSULE ORAL DAILY
COMMUNITY
End: 2018-01-30 | Stop reason: SDUPTHER

## 2018-01-30 NOTE — PATIENT INSTRUCTIONS
Migraine Headache   AMBULATORY CARE:   A migraine headache  is a severe headache  The pain can be so severe that it interferes with your daily activities  A migraine can last a few hours up to several days  The exact cause of migraines is not known  Common triggers for a migraine include the following:   · Stress, eye strain, oversleeping, or not getting enough sleep    · Hormone changes in women from birth control pills, pregnancy, menopause, or during a monthly period    · Skipping meals, going too long without eating, or not drinking enough liquids    · Certain foods or drinks such as chocolate, hard cheese, red wine, or drinks that contain caffeine    · Foods that contain gluten, nitrates, MSG, or artificial sweeteners    · Sunlight, bright or flashing lights, loud noises, smoke, or strong smells    · Heat, humidity, or changes in the weather  Common warning signs include the following:  Warning signs usually start 15 to 60 minutes before the headache:  · Visual changes (auras), such as blurred vision, temporary blind or bright spots, lines, or hallucinations    · Unusual tiredness or frequent yawning    · Tingling in an arm or leg  Seek care immediately if:   · You have a headache that seems different or much worse than your usual migraine headache  · You have a severe headache with a fever or a stiff neck  · You have new problems with speech, vision, balance, or movement  · You feel like you are going to faint, you become confused, or you have a seizure  Contact your healthcare provider or neurologist if:   · You have a fever  · Your migraines interfere with your daily activities  · Your medicines or treatments stop working  · You have questions about your condition or care  Treatment:  Migraines cannot be cured  The goal of treatment is to reduce your symptoms  Take medicine as soon as you feel a migraine begin  · Prescription pain medicine  may be given   Do not wait until the pain is severe before you take your medicine  · Migraine medicines  are used to help prevent a migraine or stop it once it starts  · Antinausea medicine  may be given to calm your stomach and to help prevent vomiting  This medicine can also help relieve pain  Manage your symptoms:   · Rest in a dark, quiet room  This will help decrease your pain  Sleep may also help relieve the pain  · Apply ice to decrease pain  Use an ice pack, or put crushed ice in a plastic bag  Cover the ice pack with a towel and place it on your head where it hurts for 15 to 20 minutes every hour  · Apply heat to decrease pain and muscle spasms  Use a small towel dampened with warm water or a heating pad, or sit in a warm bath  Apply heat on the area for 20 to 30 minutes every 2 hours  You may alternate heat and ice  · Keep a migraine record  Write down when your migraines start and stop  Include your symptoms and what you were doing when a migraine began  Record what you ate or drank for 24 hours before the migraine started  Keep track of what you did to treat your migraine and if it worked  Follow up with your healthcare provider as directed:  Bring your migraine record with you  Write down your questions so you remember to ask them during your visits  Prevent another migraine headache:   · Do not smoke  Nicotine and other chemicals in cigarettes and cigars can trigger a migraine and also cause lung damage  Ask your healthcare provider for information if you currently smoke and need help to quit  E-cigarettes or smokeless tobacco still contain nicotine  Talk to your healthcare provider before you use these products  · Do not drink alcohol  Alcohol can trigger a migraine  It can also interfere with the medicines used to treat your migraine  · Exercise regularly  Ask about the best exercise plan for you  · Manage stress  Stress may trigger a migraine  Learn new ways to relax, such as deep breathing      · Follow a sleep schedule  Go to bed and get up at the same time each day  · Eat a variety of healthy foods  Healthy foods include fruit, vegetables, whole-grain breads, low-fat dairy products, beans, lean meats, and fish  Do not have foods or drinks that trigger your migraines  © 2017 [address] [employer] Information is for End User's use only and may not be sold, redistributed or otherwise used for commercial purposes  All illustrations and images included in CareNotes® are the copyrighted property of A [employer] M [employer]  or [employer]  The above information is an  only  It is not intended as medical advice for individual conditions or treatments  Talk to your doctor, nurse or pharmacist before following any medical regimen to see if it is safe and effective for you

## 2018-01-30 NOTE — PROGRESS NOTES
ASSESSMENT/PLAN:    1  Chronic migraine without aura without status migrainosus, not intractable  · Has tried multiple treatments in the past including most recently Imitrex  · Currently uses Fioricet 2 tablets daily for prophylaxis; states without it, she gets migraines on almost a daily basis  · No specific triggers  · Associated with photophobia, phonophobia, nausea, blurred vision  · Will prescribe vitamin B2 400 mg daily  · Will also prescribe Topamax 25 mg qHS x1 week and increase by 25 mg weekly for eventual dose of 50 mg BID  · Will refill Fioricet but advised use for abortive therapy only  · Follow-up in 6 weeks for re-evaluation    2  Mild intermittent asthma without complication  · Reports infrequent use of albuterol rescue inhaler approximately twice per week or less  · Continue albuterol and follow-up as needed    3  Severe episode of recurrent major depressive disorder, without psychotic features [facility]  · Was previously followed by psychiatry before insurance change; patient expresses desire to establish care with new psychiatrist  · Will place referral order for psychiatry  · Will also restart Prozac 20 mg daily; patient is formula-feeding and does not intend to breastfeed at all    4  Anxiety  · See plan above      Health Maintenance:  Up to date with all vaccinations    Schedule a follow-up appointment in 6 weeks or sooner if needed  CHIEF COMPLAINT: Establish care, chronic migraines, depression/anxiety    HISTORY OF PRESENT ILLNESS:  32year old female with past medical history significant for chronic migraines, mild intermittent asthma, and depression/anxiety presents to the office to establish care with a new PCP  Patient had been following with a PCP at [facility] but decided to change PCPs after the recent delivery of her daughter at [place] [address]       Her complaints include chronic migraines for which she has tried multiple medications in the past  Currently, she takes Fiorcet 2 tablets on a daily basis as prophylaxis  She was previously on 1 tablet but states she began to have breakthrough headaches and increased the dose to 2 tablets  She also has a history of depression/anxiety for which she was on Prozac and was following with a psychiatrist at [facility]  However, due to insurance changes, she was no longer able to follow with that psychiatrist  She states she has been off of Prozac for about 1 year now and did so of her own accord out of concern for her pregnancy  She expresses desire to re-establish care with a new psychiatrist if possible  She states she has been feeling well otherwise  Review of Systems   Neurological: Positive for headaches (History of chronic migraines, associated with photophobia, phonophobia, blurred vision, and nausea)  Psychiatric/Behavioral:        Anxiety and depressive symptoms   All other systems reviewed and are negative  OBJECTIVE:  Vitals:    01/30/18 1004   BP: 100/76   Pulse: 84   Temp: 98 4 °F (36 9 °C)   Weight: 56 kg (123 lb 7 3 oz)   Height: 5' 3" (1 6 m)     Physical Exam   Constitutional: She is oriented to person, place, and time  She appears well-developed and well-nourished  No distress  HENT:   Head: Normocephalic and atraumatic  Right Ear: External ear normal    Left Ear: External ear normal    Nose: Nose normal    Mouth/Throat: Oropharynx is clear and moist  No oropharyngeal exudate  Eyes: Conjunctivae and EOM are normal  Pupils are equal, round, and reactive to light  No scleral icterus  Neck: Neck supple  No JVD present  No tracheal deviation present  No thyromegaly present  Cardiovascular: Normal rate, regular rhythm and intact distal pulses  Exam reveals no gallop and no friction rub  Murmur (Grade I/VI systolic murmur loudest at RUSB) heard  Pulmonary/Chest: Effort normal and breath sounds normal  No respiratory distress  She has no wheezes  She has no rales  She exhibits no tenderness  Abdominal: Soft   Bowel sounds are normal  She exhibits no distension and no mass  There is no tenderness  There is no rebound and no guarding  Musculoskeletal: Normal range of motion  She exhibits no edema, tenderness or deformity  Neurological: She is alert and oriented to person, place, and time  No cranial nerve deficit  Skin: Skin is warm and dry  No rash noted  She is not diaphoretic  No erythema  No pallor  Psychiatric: She has a normal mood and affect  Her behavior is normal  Judgment and thought content normal    History of depression/anxiety but current mood and affect appears normal   Nursing note and vitals reviewed          Current Outpatient Prescriptions:     butalbital-acetaminophen-caffeine (FIORICET,ESGIC) -40 mg per tablet, Take 1 tablet by mouth daily as needed for headaches, Disp: 10 tablet, Rfl: 0    clobetasol (TEMOVATE) 0 05 % cream, Apply topically, Disp: , Rfl:     FLUoxetine (PROzac) 20 mg capsule, Take 20 mg by mouth daily, Disp: , Rfl:     NUVARING 0 12-0 015 MG/24HR vaginal ring, INSERT 1 RING VAGINALLY FOR 3 WEEKS THEN 1 WEEK OFF, Disp: , Rfl: 11    Prenatal Vit-Fe Fum-FA-Omega (PRENATAL MULTI +DHA) 27-0 8-228 MG CAPS, Take 1 tablet by mouth daily, Disp: , Rfl:     albuterol (PROVENTIL HFA,VENTOLIN HFA) 90 mcg/act inhaler, Inhale 2 puffs every 6 (six) hours as needed for wheezing, Disp: , Rfl:     butalbital-acetaminophen-caffeine (FIORICET,ESGIC) -40 mg per tablet, Take 1 tablet by mouth 3 (three) times a day as needed, Disp: , Rfl:     montelukast (SINGULAIR) 10 mg tablet, Take 10 mg by mouth daily, Disp: , Rfl: 1    Past Medical History:   Diagnosis Date    Abnormal Pap smear of cervix     Asthma     Eczema     Endometriosis     Heart murmur     Migraine     MRSA infection 2011    decolonized in beginning of pregnancy according to Pt    Pyelonephritis     Urinary retention     Varicella     childhood    Visual impairment     contacts     Past Surgical History: Procedure Laterality Date    EXPLORATORY LAPAROTOMY      TONSILLECTOMY Bilateral      Social History     Social History    Marital status: Single     Spouse name: N/A    Number of children: N/A    Years of education: N/A     Occupational History    Not on file  Social History Main Topics    Smoking status: Never Smoker    Smokeless tobacco: Never Used    Alcohol use No    Drug use: No    Sexual activity: Yes     Partners: Male     Birth control/ protection: None     Other Topics Concern    Not on file     Social History Narrative    No narrative on file     History reviewed  No pertinent family history     ==  DO Luis Aj [address] Internal Medicine PGY-1    [facility]  [address] , [address] [address] [facility] [address] [facility] [address] [facility]  Office: [phone]  Fax: [phone]

## 2018-02-05 ENCOUNTER — TELEPHONE (OUTPATIENT)
Dept: LAB | Facility: CLINIC | Age: 28
End: 2018-02-05

## 2018-02-05 DIAGNOSIS — G43.909 MIGRAINE: Primary | ICD-10-CM

## 2018-02-05 RX ORDER — METOCLOPRAMIDE 5 MG/1
5 TABLET ORAL 4 TIMES DAILY
Qty: 56 TABLET | Refills: 1 | Status: SHIPPED | OUTPATIENT
Start: 2018-02-05 | End: 2018-04-17 | Stop reason: ALTCHOICE

## 2018-02-05 RX ORDER — NAPROXEN 250 MG/1
250 TABLET ORAL 2 TIMES DAILY WITH MEALS
Qty: 28 TABLET | Refills: 1 | Status: SHIPPED | OUTPATIENT
Start: 2018-02-05 | End: 2018-07-24 | Stop reason: ALTCHOICE

## 2018-02-05 NOTE — PROGRESS NOTES
Called and spoke to patient regarding refill request for Fioricet  Advised patient to try alternative abortive therapy  Patient agreeable to trying Naproxen + Reglan  Also instructed patient to continue taking Topamax and Riboflavin for prophylactic therapy  Prescriptions sent to pharmacy for Naproxen 250 mg BID and Reglan 5 mg QID  Will follow-up

## 2018-02-08 NOTE — TELEPHONE ENCOUNTER
I called and spoke to patient  She said that she went to the pharmacy to  the naproxen and reglan but the pharmacy said there were no prescription sent  She rechecked this morning with the pharmacy and states prescriptions are there  She is going to  the naproxen and reglan today  She will try them first and will call if she continues to have migraine  Thanks

## 2018-03-07 NOTE — PROGRESS NOTES
Education  [employer] Education 1st Trimester:   First Trimester Education provided:   benefits of breastfeeding, importance of exclusive breastfeeding, early initiation of breastfeeding and exclusive breastfeeding for the first 6 months   The patient is planning on breastfeeding     Prenatal education provided by: Nisreen Hein   Electronically signed by : Ellie Callaway, ; Apr 13 2017 10:53AM EST                       (Author)

## 2018-03-13 PROBLEM — Z28.39 RUBELLA NON-IMMUNE STATUS, ANTEPARTUM: Status: RESOLVED | Noted: 2017-07-31 | Resolved: 2018-03-13

## 2018-03-13 PROBLEM — O09.899 RUBELLA NON-IMMUNE STATUS, ANTEPARTUM: Status: RESOLVED | Noted: 2017-07-31 | Resolved: 2018-03-13

## 2018-04-10 ENCOUNTER — TELEPHONE (OUTPATIENT)
Dept: INTERNAL MEDICINE CLINIC | Facility: CLINIC | Age: 28
End: 2018-04-10

## 2018-04-10 NOTE — TELEPHONE ENCOUNTER
Patient thought she had an appointment with us today  She wanted to come in today for follow up for migraines and said she's having stomach again which she believes is due to endometriosis  She didn't go to her GYN appointment today  She said she will reschedule that appointment  I offered her an appointment for today at 2:30  She said she is unable to make that appointment and will go to the Er  Ok per verbal communication with Miguelangel Hancock

## 2018-04-17 ENCOUNTER — OFFICE VISIT (OUTPATIENT)
Dept: OBGYN CLINIC | Facility: CLINIC | Age: 28
End: 2018-04-17
Payer: COMMERCIAL

## 2018-04-17 ENCOUNTER — TRANSCRIBE ORDERS (OUTPATIENT)
Dept: ADMINISTRATIVE | Facility: HOSPITAL | Age: 28
End: 2018-04-17

## 2018-04-17 VITALS — WEIGHT: 126.6 LBS | BODY MASS INDEX: 22.43 KG/M2 | DIASTOLIC BLOOD PRESSURE: 60 MMHG | SYSTOLIC BLOOD PRESSURE: 110 MMHG

## 2018-04-17 DIAGNOSIS — N80.9 ENDOMETRIOSIS: Primary | ICD-10-CM

## 2018-04-17 PROCEDURE — 99213 OFFICE O/P EST LOW 20 MIN: CPT | Performed by: OBSTETRICS & GYNECOLOGY

## 2018-04-17 RX ORDER — LEVONORGESTREL AND ETHINYL ESTRADIOL 0.15-0.03
1 KIT ORAL DAILY
Qty: 91 TABLET | Refills: 2 | Status: SHIPPED | OUTPATIENT
Start: 2018-04-17 | End: 2018-07-10 | Stop reason: SINTOL

## 2018-04-17 NOTE — PROGRESS NOTES
Assessment/Plan:      Diagnoses and all orders for this visit:    Endometriosis  -     AMB US pelvis complete w transvaginal; Future  -     levonorgestrel-ethinyl estradiol (SEASONALE) 0 15-0 03 MG per tablet; Take 1 tablet by mouth daily      Will check pelvic US and change her to extended cycle OCPs, which should help if this pain is endometriosis, and has worked in the past for her  Will follow up in several months for her yearly exam   Cultures declined today  Subjective:     Patient ID: Adelita Marte is a 29 y o  female  Estefania Alexandre is a 33yo here to discuss her prior diagnosis of endometriosis and changing her form of contraception  Has been using Nuvaring - is unsure if she removed her last one, would like me to check today  Has previously used extended cycle OCPs, and found that these helped with the pelvic pain she has been having      - Same partner, no vaginal discharge, no concerns for STIs  - Ongoing headaches, no aura, has follow up with her PCP for this  - Daily lower abdominal pain, crampy/achy     - No irregular bleeding            Review of Systems   Gastrointestinal: Positive for abdominal pain  Genitourinary: Positive for pelvic pain  Negative for vaginal bleeding, vaginal discharge and vaginal pain  Neurological: Positive for headaches         Past Medical History:   Diagnosis Date    Abnormal Pap smear of cervix     , spontaneous complete     RESOLVED:     Anxiety and depression     Asthma     LAST ASSESSED: 17    Eczema     Endometriosis     Heart murmur     Methicillin resistant Staphylococcus aureus infection     LAST ASSESSED: 17    Migraine     MRSA infection     decolonized in beginning of pregnancy according to Pt    Pyelonephritis     Recurrent canker sores     LAST ASSESSED: 17    Urinary retention     Varicella     childhood    Visual impairment     contacts     Past Surgical History:   Procedure Laterality Date    COLPOSCOPY      WSITH BIOPSY(S); RESOLVED: 2017    EXPLORATORY LAPAROTOMY      TONSILLECTOMY Bilateral          Objective:  /60   Wt 57 4 kg (126 lb 9 6 oz)   LMP 04/09/2018   BMI 22 43 kg/m²      Physical Exam   Constitutional: She appears well-developed and well-nourished  Abdominal: Soft  Genitourinary: There is no rash or lesion on the right labia  There is no rash or lesion on the left labia  Uterus is tender  Uterus is not enlarged  Cervix exhibits no motion tenderness  Right adnexum displays no tenderness and no fullness  Left adnexum displays tenderness  No bleeding in the vagina  No foreign body in the vagina  No signs of injury around the vagina  Vitals reviewed

## 2018-04-17 NOTE — PATIENT INSTRUCTIONS
Endometriosis   AMBULATORY CARE:   Endometriosis  is a condition in which tissue that is normally only in your uterus grows outside of the uterus  Endometriosis causes tissue that should be shed during a monthly period to grow on your ovaries, fallopian tubes, bladder, or other organs  Organs and tissue may stick together and cause inflammation and pain  Common symptoms include the following:   · Abdominal pain or nausea and vomiting before or during your period    · Painful periods     · Feeling full or bloated    · Dizziness or fatigue    · Heavy periods, or vaginal bleeding at times other than during your monthly period    · Infertility (being unable to get pregnant)    · Lower back pain or painful bowel movements during your monthly periods    · Pain during or after sex    · Pain when you urinate  Seek care immediately if:   · You have severe pain that does not go away after you take pain medicine  Contact your healthcare provider if:   · Your symptoms return after treatment  · You have heavy or unusual vaginal bleeding  · You see blood in your urine or bowel movement  · You have questions or concerns about your condition or care  Medicines:   · Hormones  may help shrink endometrial tissue and decrease pain and inflammation  You may be given birth control pills, androgen hormones, or medicine that makes your body produce less of certain hormones  · Acetaminophen  decreases pain and is available without a doctor's order  Ask how much to take and how often to take it  Follow directions  Acetaminophen can cause liver damage if not taken correctly  · NSAIDs , such as ibuprofen, help decrease swelling, pain, and fever  This medicine is available with or without a doctor's order  NSAIDs can cause stomach bleeding or kidney problems in certain people  If you take blood thinner medicine, always ask your healthcare provider if NSAIDs are safe for you   Always read the medicine label and follow directions  · Take your medicine as directed  Contact your healthcare provider if you think your medicine is not helping or if you have side effects  Tell him or her if you are allergic to any medicine  Keep a list of the medicines, vitamins, and herbs you take  Include the amounts, and when and why you take them  Bring the list or the pill bottles to follow-up visits  Carry your medicine list with you in case of an emergency  Self-care:   · Apply heat  on your abdomen for 20 to 30 minutes every 2 hours for as many days as directed  Heat helps decrease pain and muscle spasms  · Exercise regularly  to help reduce symptoms, such as pain  Ask about the best exercise plan for you  Follow up with your healthcare provider as directed:  Write down your questions so you remember to ask them during your visits  © 2017 [address] Information is for End User's use only and may not be sold, redistributed or otherwise used for commercial purposes  All illustrations and images included in CareNotes® are the copyrighted property of [employer]  or Luis Adhikari  The above information is an  only  It is not intended as medical advice for individual conditions or treatments  Talk to your doctor, nurse or pharmacist before following any medical regimen to see if it is safe and effective for you

## 2018-04-19 ENCOUNTER — TELEPHONE (OUTPATIENT)
Dept: OBGYN CLINIC | Facility: CLINIC | Age: 28
End: 2018-04-19

## 2018-04-19 NOTE — TELEPHONE ENCOUNTER
Pt called says pharmacy needs prior auth for her birth control  States pharmacy faxed over a form on Tuesday but they haven't heard back yet  Patient requests a call back when this is done

## 2018-04-22 ENCOUNTER — HOSPITAL ENCOUNTER (OUTPATIENT)
Dept: RADIOLOGY | Facility: HOSPITAL | Age: 28
Discharge: HOME | End: 2018-04-22
Attending: OBSTETRICS & GYNECOLOGY
Payer: COMMERCIAL

## 2018-04-22 DIAGNOSIS — N80.9 ENDOMETRIOSIS: ICD-10-CM

## 2018-04-22 PROCEDURE — 76856 US EXAM PELVIC COMPLETE: CPT

## 2018-04-22 PROCEDURE — 76830 TRANSVAGINAL US NON-OB: CPT

## 2018-04-25 RX ORDER — AMOXICILLIN 500 MG/1
CAPSULE ORAL
Refills: 0 | COMMUNITY
Start: 2018-04-12 | End: 2018-05-25 | Stop reason: ALTCHOICE

## 2018-04-25 RX ORDER — IBUPROFEN 600 MG/1
TABLET ORAL
Refills: 0 | COMMUNITY
Start: 2018-04-12 | End: 2018-05-25 | Stop reason: ALTCHOICE

## 2018-04-25 RX ORDER — ACETAMINOPHEN AND CODEINE PHOSPHATE 300; 30 MG/1; MG/1
1 TABLET ORAL
Refills: 0 | COMMUNITY
Start: 2018-04-12 | End: 2018-05-25 | Stop reason: ALTCHOICE

## 2018-04-26 ENCOUNTER — TELEPHONE (OUTPATIENT)
Dept: INTERNAL MEDICINE CLINIC | Facility: CLINIC | Age: 28
End: 2018-04-26

## 2018-04-26 ENCOUNTER — OFFICE VISIT (OUTPATIENT)
Dept: INTERNAL MEDICINE CLINIC | Facility: CLINIC | Age: 28
End: 2018-04-26
Payer: COMMERCIAL

## 2018-04-26 VITALS
SYSTOLIC BLOOD PRESSURE: 104 MMHG | HEIGHT: 63 IN | WEIGHT: 123.9 LBS | DIASTOLIC BLOOD PRESSURE: 62 MMHG | TEMPERATURE: 98.1 F | HEART RATE: 80 BPM | BODY MASS INDEX: 21.95 KG/M2

## 2018-04-26 DIAGNOSIS — G43.909 MIGRAINE: ICD-10-CM

## 2018-04-26 DIAGNOSIS — M25.649: Primary | ICD-10-CM

## 2018-04-26 DIAGNOSIS — N80.9 ENDOMETRIOSIS: ICD-10-CM

## 2018-04-26 PROCEDURE — 99214 OFFICE O/P EST MOD 30 MIN: CPT | Performed by: INTERNAL MEDICINE

## 2018-04-26 RX ORDER — PROPRANOLOL HYDROCHLORIDE 20 MG/1
20 TABLET ORAL EVERY 12 HOURS SCHEDULED
Qty: 60 TABLET | Refills: 0 | Status: SHIPPED | OUTPATIENT
Start: 2018-04-26 | End: 2018-05-25 | Stop reason: ALTCHOICE

## 2018-04-26 RX ORDER — METOCLOPRAMIDE 10 MG/1
10 TABLET ORAL 2 TIMES DAILY PRN
Qty: 60 TABLET | Refills: 0 | Status: SHIPPED | OUTPATIENT
Start: 2018-04-26 | End: 2018-05-25 | Stop reason: ALTCHOICE

## 2018-04-26 NOTE — PROGRESS NOTES
Bilateral hand small joints, wrist joints - pain, stiffness - dg  most likely RA    - physical exam - alert, oriented x3, no gross motor or sensory abnormalities, CN 1-12 intact

## 2018-04-26 NOTE — TELEPHONE ENCOUNTER
Patient called into [facility] and is frustrated  States she informed you that the reglan does not work for her so she did not want to take that medication , also the pharmacist informed her that the propanolol is useless once she already has a migraine so she will not be taking that medication neither  She would like to know if you can order 2 different medications for her

## 2018-04-26 NOTE — PROGRESS NOTES
CLINIC VISIT NOTE  Duarte Torres 29 y o  female   MRN: [id number]    ASSESSMENT/PLAN:  Diagnoses and all orders for this visit:    Morning joint stiffness of hand    Endometriosis    Migraine    Other orders  -     acetaminophen-codeine (TYLENOL #3) 300-30 mg per tablet; Take 1 tablet by mouth every 4 to 6 hours as needed for pain  -     amoxicillin (AMOXIL) 500 mg capsule; TAKE 1 CAPSULE BY MOUTH 3 TIMES DAILY UNTIL FINISHED  -     ibuprofen (MOTRIN) 600 mg tablet; TAKE 1 TABLET BY MOUTH EVERY 6 HOURS FOR FIRST 48 HOURS THEN AS NEEDED FOR PAIN      MCP joints, wrist joints bilateral hand stiffness and pain - possible df dg  RA vs  OA  - patient has symptoms since September 2017, denies any recent respiratory infection, flu symptoms, mouth ulcers, rashes  - pain is relieved with aleve, mother suffers from RA  - will order CBC, CMP, EDELMIRA, RF, ESR, CRP, bilateral hand x ray X ray  - at this time continue with NSAIDs    Endometriosis - f/u with obgyn for suprapubic chronic pain    Migraines - patient tried different medications in the past, she wants fioricet, used to take 1 pill twice daily with good response  - patient admits to daily headaches with numbness sometimes nausea as a prodrome  - prescribed reglan prn, propranolol 20 mg BID and continue NSAIDs  - patient was previously given referral for neurology    Health Maintenance: UTD with immunizations    Schedule a follow-up appointment in 3 months  Chief Complaint: suprapubic pain, hand joint stiffness and pain    History of Present Illness:  Patient is 29years old female who comes for an acute complaint of hand and wrist joint stiffness and pain since September 2017  Patient is using Aleve with good response  Otherwise patient denies any recent respiratory infections, flu symptoms, fever, chills, rashes, changes in her nails, mouth sores, pain in other joints    Patient also complains from daily headaches, migraines associated with typical prodromes of nausea, numbness on right side of her head  Patient also admits to suprapubic chronic abdominal pain  She went to see  MEDICINE [facility] physician who ordered pelvic ultrasound  Patient will need to follow up with OB Gyn  Otherwise no other medical issues  Review of Systems   Constitutional: Negative for chills  HENT: Negative for congestion, postnasal drip and sore throat  Eyes: Negative for pain  Respiratory: Negative for cough and shortness of breath  Cardiovascular: Negative for chest pain and palpitations  Gastrointestinal: Positive for abdominal pain  Negative for abdominal distention, constipation, diarrhea, nausea and vomiting  Genitourinary: Negative for dysuria  Musculoskeletal: Positive for arthralgias  Negative for myalgias  Small hand joints stiffness and pain   Neurological: Positive for headaches  Negative for dizziness and light-headedness  Psychiatric/Behavioral: Negative for behavioral problems  The patient is not nervous/anxious  Objective:  Vitals:    04/26/18 0802   BP: 104/62   Pulse: 80   Temp: 98 1 °F (36 7 °C)   Weight: 56 2 kg (123 lb 14 4 oz)   Height: 5' 3" (1 6 m)     Physical Exam   Constitutional: She appears well-developed and well-nourished  HENT:   Head: Normocephalic and atraumatic  Mouth/Throat: No oropharyngeal exudate  Eyes: Conjunctivae are normal  Pupils are equal, round, and reactive to light  Right eye exhibits no discharge  Left eye exhibits no discharge  No scleral icterus  Neck: Neck supple  Cardiovascular: Normal rate and regular rhythm  No murmur heard  Pulmonary/Chest: Effort normal and breath sounds normal  No respiratory distress  She has no wheezes  She has no rales  Abdominal: Soft  There is tenderness  Mild suprapubic tenderness   Musculoskeletal: She exhibits no edema  Denies hand joints wrist joint tenderness   Neurological: She is alert  Skin: Skin is warm  No erythema     Psychiatric: She has a normal mood and affect           Current Outpatient Prescriptions:     albuterol (PROVENTIL HFA,VENTOLIN HFA) 90 mcg/act inhaler, Inhale 2 puffs every 6 (six) hours as needed for wheezing, Disp: , Rfl:     clobetasol (TEMOVATE) 0 05 % cream, Apply topically, Disp: , Rfl:     ibuprofen (MOTRIN) 600 mg tablet, TAKE 1 TABLET BY MOUTH EVERY 6 HOURS FOR FIRST 48 HOURS THEN AS NEEDED FOR PAIN, Disp: , Rfl: 0    levonorgestrel-ethinyl estradiol (SEASONALE) 0 15-0 03 MG per tablet, Take 1 tablet by mouth daily, Disp: 91 tablet, Rfl: 2    acetaminophen-codeine (TYLENOL #3) 300-30 mg per tablet, Take 1 tablet by mouth every 4 to 6 hours as needed for pain, Disp: , Rfl: 0    amoxicillin (AMOXIL) 500 mg capsule, TAKE 1 CAPSULE BY MOUTH 3 TIMES DAILY UNTIL FINISHED, Disp: , Rfl: 0    montelukast (SINGULAIR) 10 mg tablet, Take 10 mg by mouth daily, Disp: , Rfl: 1    naproxen (NAPROSYN) 250 mg tablet, Take 1 tablet (250 mg total) by mouth 2 (two) times a day with meals, Disp: 28 tablet, Rfl: 1    Past Medical History:   Diagnosis Date    Abnormal Pap smear of cervix     , spontaneous complete     RESOLVED:     Anxiety and depression     Asthma     LAST ASSESSED: 17    Eczema     Endometriosis     Heart murmur     Methicillin resistant Staphylococcus aureus infection     LAST ASSESSED: 17    Migraine     MRSA infection     decolonized in beginning of pregnancy according to Pt    Pyelonephritis     Recurrent canker sores     LAST ASSESSED: 17    Urinary retention     Varicella     childhood    Visual impairment     contacts     Past Surgical History:   Procedure Laterality Date    COLPOSCOPY      WSITH BIOPSY(S); RESOLVED: 2017    EXPLORATORY LAPAROTOMY      TONSILLECTOMY Bilateral      Social History     Social History    Marital status: Single     Spouse name: N/A    Number of children: N/A    Years of education: N/A     Occupational History    EMPLOYED      Social History Main Topics    Smoking status: Never Smoker    Smokeless tobacco: Never Used    Alcohol use No    Drug use: No    Sexual activity: Yes     Partners: Male     Birth control/ protection: None, Condom     Other Topics Concern    Not on file     Social History Narrative    ALWAYS USES SEATBELT    CAFFEINE USE    NO DAILY CAFFEINE COFFEE CONSUMPTION    EXERCISES RARELY    DOES NOT EXERCISE REGULARLY    LIVES WITH FAMILY    NO LIVING WILL    NO Scientologist BELIEFS             Family History   Problem Relation Age of Onset    Hyperlipidemia Mother     Hypertension Mother     Osteoporosis Mother     Migraines Mother     Anxiety disorder Father     Depression Father     Asthma Sister     Asthma Brother     Hypertension Brother     Arthritis Maternal Grandmother     Hyperlipidemia Maternal Grandmother     Hypertension Maternal Grandmother     Osteoporosis Maternal Grandmother        ==  Rosalee Brothers MD    [facility]  [address]   [facility] , [address] [address] [facility] [address] [facility]  Office: [phone]  Fax: [phone]

## 2018-04-27 ENCOUNTER — TELEPHONE (OUTPATIENT)
Dept: INTERNAL MEDICINE CLINIC | Facility: CLINIC | Age: 28
End: 2018-04-27

## 2018-04-27 ENCOUNTER — TELEPHONE (OUTPATIENT)
Dept: NEUROLOGY | Facility: CLINIC | Age: 28
End: 2018-04-27

## 2018-04-27 DIAGNOSIS — G43.909 MIGRAINE: Primary | ICD-10-CM

## 2018-04-27 DIAGNOSIS — G43.019 INTRACTABLE MIGRAINE WITHOUT AURA AND WITHOUT STATUS MIGRAINOSUS: Primary | ICD-10-CM

## 2018-04-27 DIAGNOSIS — G43.719 INTRACTABLE CHRONIC MIGRAINE WITHOUT AURA AND WITHOUT STATUS MIGRAINOSUS: ICD-10-CM

## 2018-04-27 RX ORDER — ACETAMINOPHEN AND CODEINE PHOSPHATE 300; 30 MG/1; MG/1
TABLET ORAL EVERY 6 HOURS PRN
Qty: 10 TABLET | Refills: 0 | Status: CANCELLED | OUTPATIENT
Start: 2018-04-27

## 2018-04-27 RX ORDER — BUTALBITAL, ACETAMINOPHEN AND CAFFEINE 50; 325; 40 MG/1; MG/1; MG/1
1 TABLET ORAL EVERY 6 HOURS PRN
Qty: 10 TABLET | Refills: 0 | Status: SHIPPED | OUTPATIENT
Start: 2018-04-27 | End: 2018-07-24 | Stop reason: ALTCHOICE

## 2018-04-27 RX ORDER — BUTALBITAL, ACETAMINOPHEN AND CAFFEINE 50; 325; 40 MG/1; MG/1; MG/1
1 TABLET ORAL EVERY 6 HOURS PRN
Qty: 10 TABLET | Refills: 0 | Status: SHIPPED | OUTPATIENT
Start: 2018-04-27 | End: 2018-04-27 | Stop reason: SDUPTHER

## 2018-04-27 NOTE — TELEPHONE ENCOUNTER
Order for neurology placed, pls call patient and let her know that she needs to come to the clinic to  the script for fioricet as this is controlled substance  Prashanth Oneal has the script  Thanks

## 2018-04-27 NOTE — TELEPHONE ENCOUNTER
I followed up on this task, called patient and addressed issue  She is agreeable with our treatment plan

## 2018-04-27 NOTE — PROGRESS NOTES
Patient called and requested talking to the manager  She requested fioricet as this is the only medication working for her daily migraines  Case was discussed with Dr Jose Cantu  I spoke to the patient over the phone and recommended topamax - patient refused as this medication is not working for her  She was prescribed 10 pills of fioricet and advised that this is the last prescription of fioricet from clinic and it will not be continued  She was advised to follow up with neurology

## 2018-05-21 ENCOUNTER — TELEPHONE (OUTPATIENT)
Dept: OBGYN CLINIC | Facility: CLINIC | Age: 28
End: 2018-05-21

## 2018-05-21 NOTE — TELEPHONE ENCOUNTER
Pt called - not sure if has infection - she used a tampon (she normally uses pads only) - but since using th tampon she has had a strong odor - not sure what from - no other sx's - please advise  [phone]

## 2018-05-21 NOTE — TELEPHONE ENCOUNTER
Spoke with pt - last seen in April  Patient LMP 05/17 - still has it - light flow now  Patient usually uses pads  On day 2 of her period, she ran out of them so used a tampon  Changed it after couple of hours till she was able to buy more pads  Since then, she has been noticing she has a strong odor  No itching or burning  Patient is worried  OV? Please advise  Thanks! * Patient is aware will not get back to her till tomorrow

## 2018-05-22 ENCOUNTER — TELEPHONE (OUTPATIENT)
Dept: OBGYN CLINIC | Facility: CLINIC | Age: 28
End: 2018-05-22

## 2018-05-22 NOTE — TELEPHONE ENCOUNTER
Spoke with pt    On first 2-3 weeks of seasonal  c/o brown disch and foul odor  Apt being sched by [facility] for pt

## 2018-05-22 NOTE — TELEPHONE ENCOUNTER
Spoke to pt to carla an appt  Gave pt multiple days and times pt refused   Pt can not miss work  states she is going to the er

## 2018-05-22 NOTE — TELEPHONE ENCOUNTER
Pt called in regards to brown discharge   Pt states she is experiencing brown discharge with odor also lower abdominal pain please advise

## 2018-05-25 ENCOUNTER — OFFICE VISIT (OUTPATIENT)
Dept: OBGYN CLINIC | Facility: CLINIC | Age: 28
End: 2018-05-25
Payer: COMMERCIAL

## 2018-05-25 VITALS — WEIGHT: 125 LBS | DIASTOLIC BLOOD PRESSURE: 60 MMHG | SYSTOLIC BLOOD PRESSURE: 102 MMHG | BODY MASS INDEX: 22.14 KG/M2

## 2018-05-25 DIAGNOSIS — N76.0 BACTERIAL VAGINOSIS: Primary | ICD-10-CM

## 2018-05-25 DIAGNOSIS — B96.89 BACTERIAL VAGINOSIS: Primary | ICD-10-CM

## 2018-05-25 PROCEDURE — 99213 OFFICE O/P EST LOW 20 MIN: CPT | Performed by: PHYSICIAN ASSISTANT

## 2018-05-25 RX ORDER — METRONIDAZOLE 500 MG/1
500 TABLET ORAL EVERY 12 HOURS SCHEDULED
Qty: 14 TABLET | Refills: 0 | Status: SHIPPED | OUTPATIENT
Start: 2018-05-25 | End: 2018-06-01

## 2018-05-25 NOTE — PROGRESS NOTES
Murray Austin  1990    S:  29 y o  female here for a problem visit  She started Seasonale this past month  She had been on this pill in the past without problems  Her period lasted longer than usual and she now still has brown spotting and foul odor         Past Medical History:   Diagnosis Date    Abnormal Pap smear of cervix     , spontaneous complete     RESOLVED:     Anxiety and depression     Asthma     LAST ASSESSED: 17    Eczema     Endometriosis     Heart murmur     Methicillin resistant Staphylococcus aureus infection     LAST ASSESSED: 17    Migraine     MRSA infection     decolonized in beginning of pregnancy according to Pt    Pyelonephritis     Recurrent canker sores     LAST ASSESSED: 17    Urinary retention     Varicella     childhood    Visual impairment     contacts     Family History   Problem Relation Age of Onset    Hyperlipidemia Mother     Hypertension Mother     Osteoporosis Mother     Migraines Mother     Anxiety disorder Father     Depression Father     Asthma Sister     Asthma Brother     Hypertension Brother     Arthritis Maternal Grandmother     Hyperlipidemia Maternal Grandmother     Hypertension Maternal Grandmother     Osteoporosis Maternal Grandmother      Social History     Social History    Marital status: Single     Spouse name: N/A    Number of children: N/A    Years of education: N/A     Occupational History    EMPLOYED      Social History Main Topics    Smoking status: Never Smoker    Smokeless tobacco: Never Used    Alcohol use No    Drug use: No    Sexual activity: Yes     Partners: Male     Birth control/ protection: None, Condom     Other Topics Concern    None     Social History Narrative    ALWAYS USES SEATBELT    CAFFEINE USE    NO DAILY CAFFEINE COFFEE CONSUMPTION    EXERCISES RARELY    DOES NOT EXERCISE REGULARLY    LIVES WITH FAMILY    NO LIVING WILL    NO Religion BELIEFS O:  /60 (BP Location: Right arm, Patient Position: Sitting, Cuff Size: Standard)   Wt 56 7 kg (125 lb)   LMP 05/12/2018 (Exact Date)   BMI 22 14 kg/m²   She appears well and is in no distress  Abdomen is soft and nontender  External genitals are normal without lesions or rashes  Vagina is with scant yellow discharge, no foreign body found    A/P: Vaginal odor - Flagyl 500mg po BID x 7 days, call in one week if not completely better

## 2018-06-05 ENCOUNTER — HOSPITAL ENCOUNTER (OUTPATIENT)
Dept: RADIOLOGY | Facility: HOSPITAL | Age: 28
Discharge: HOME | End: 2018-06-05
Payer: COMMERCIAL

## 2018-06-05 ENCOUNTER — TRANSCRIBE ORDERS (OUTPATIENT)
Dept: RADIOLOGY | Facility: HOSPITAL | Age: 28
End: 2018-06-05

## 2018-06-05 DIAGNOSIS — M25.649: ICD-10-CM

## 2018-06-05 PROCEDURE — 73130 X-RAY EXAM OF HAND: CPT

## 2018-06-07 LAB
ALBUMIN SERPL-MCNC: 4.3 G/DL (ref 3.6–5.1)
ALBUMIN/GLOB SERPL: 1.4 (CALC) (ref 1–2.5)
ALP SERPL-CCNC: 57 U/L (ref 33–115)
ALT SERPL-CCNC: 7 U/L (ref 6–29)
ANA SER QL IF: NEGATIVE
AST SERPL-CCNC: 11 U/L (ref 10–30)
BASOPHILS # BLD AUTO: 41 CELLS/UL (ref 0–200)
BASOPHILS NFR BLD AUTO: 0.7 %
BILIRUB SERPL-MCNC: 0.5 MG/DL (ref 0.2–1.2)
BUN SERPL-MCNC: 9 MG/DL (ref 7–25)
BUN/CREAT SERPL: NORMAL (CALC) (ref 6–22)
CALCIUM SERPL-MCNC: 9.3 MG/DL (ref 8.6–10.2)
CHLORIDE SERPL-SCNC: 103 MMOL/L (ref 98–110)
CO2 SERPL-SCNC: 26 MMOL/L (ref 20–31)
CREAT SERPL-MCNC: 0.71 MG/DL (ref 0.5–1.1)
CRP SERPL-MCNC: 2.3 MG/L
EOSINOPHIL # BLD AUTO: 41 CELLS/UL (ref 15–500)
EOSINOPHIL NFR BLD AUTO: 0.7 %
ERYTHROCYTE [DISTWIDTH] IN BLOOD BY AUTOMATED COUNT: 12 % (ref 11–15)
ERYTHROCYTE [SEDIMENTATION RATE] IN BLOOD BY WESTERGREN METHOD: 17 MM/H
GLOBULIN SER CALC-MCNC: 3.1 G/DL (CALC) (ref 1.9–3.7)
GLUCOSE SERPL-MCNC: 79 MG/DL (ref 65–99)
HCT VFR BLD AUTO: 35.3 % (ref 35–45)
HGB BLD-MCNC: 11.6 G/DL (ref 11.7–15.5)
LYMPHOCYTES # BLD AUTO: 2431 CELLS/UL (ref 850–3900)
LYMPHOCYTES NFR BLD AUTO: 41.2 %
MCH RBC QN AUTO: 29.7 PG (ref 27–33)
MCHC RBC AUTO-ENTMCNC: 32.9 G/DL (ref 32–36)
MCV RBC AUTO: 90.3 FL (ref 80–100)
MONOCYTES # BLD AUTO: 283 CELLS/UL (ref 200–950)
MONOCYTES NFR BLD AUTO: 4.8 %
NEUTROPHILS # BLD AUTO: 3103 CELLS/UL (ref 1500–7800)
NEUTROPHILS NFR BLD AUTO: 52.6 %
PLATELET # BLD AUTO: 272 THOUSAND/UL (ref 140–400)
PMV BLD REES-ECKER: 12.4 FL (ref 7.5–12.5)
POTASSIUM SERPL-SCNC: 4.2 MMOL/L (ref 3.5–5.3)
PROT SERPL-MCNC: 7.4 G/DL (ref 6.1–8.1)
RBC # BLD AUTO: 3.91 MILLION/UL (ref 3.8–5.1)
RHEUMATOID FACT SERPL-ACNC: <14 IU/ML
SL AMB EGFR AFRICAN AMERICAN: 134 ML/MIN/1.73M2
SL AMB EGFR NON AFRICAN AMERICAN: 116 ML/MIN/1.73M2
SODIUM SERPL-SCNC: 137 MMOL/L (ref 135–146)
WBC # BLD AUTO: 5.9 THOUSAND/UL (ref 3.8–10.8)

## 2018-07-10 ENCOUNTER — ANNUAL EXAM (OUTPATIENT)
Dept: OBGYN CLINIC | Facility: CLINIC | Age: 28
End: 2018-07-10
Payer: COMMERCIAL

## 2018-07-10 VITALS — WEIGHT: 121 LBS | BODY MASS INDEX: 22.84 KG/M2 | HEIGHT: 61 IN

## 2018-07-10 DIAGNOSIS — Z01.411 ENCOUNTER FOR GYNECOLOGICAL EXAMINATION WITH ABNORMAL FINDING: Primary | ICD-10-CM

## 2018-07-10 DIAGNOSIS — N89.8 VAGINAL DISCHARGE: ICD-10-CM

## 2018-07-10 DIAGNOSIS — N73.9 PELVIC INFLAMMATORY DISEASE: ICD-10-CM

## 2018-07-10 DIAGNOSIS — Z11.51 SCREENING FOR HUMAN PAPILLOMAVIRUS (HPV): ICD-10-CM

## 2018-07-10 DIAGNOSIS — N80.9 ENDOMETRIOSIS: ICD-10-CM

## 2018-07-10 DIAGNOSIS — R87.612 LGSIL ON PAP SMEAR OF CERVIX: ICD-10-CM

## 2018-07-10 DIAGNOSIS — N87.0 DYSPLASIA OF CERVIX, LOW GRADE (CIN 1): ICD-10-CM

## 2018-07-10 PROCEDURE — 87624 HPV HI-RISK TYP POOLED RSLT: CPT | Performed by: OBSTETRICS & GYNECOLOGY

## 2018-07-10 PROCEDURE — 88141 CYTOPATH C/V INTERPRET: CPT | Performed by: PATHOLOGY

## 2018-07-10 PROCEDURE — 87591 N.GONORRHOEAE DNA AMP PROB: CPT | Performed by: OBSTETRICS & GYNECOLOGY

## 2018-07-10 PROCEDURE — 88175 CYTOPATH C/V AUTO FLUID REDO: CPT | Performed by: PATHOLOGY

## 2018-07-10 PROCEDURE — 99395 PREV VISIT EST AGE 18-39: CPT | Performed by: OBSTETRICS & GYNECOLOGY

## 2018-07-10 PROCEDURE — 87491 CHLMYD TRACH DNA AMP PROBE: CPT | Performed by: OBSTETRICS & GYNECOLOGY

## 2018-07-10 PROCEDURE — 96372 THER/PROPH/DIAG INJ SC/IM: CPT

## 2018-07-10 RX ORDER — CEFTRIAXONE SODIUM 250 MG/1
250 INJECTION, POWDER, FOR SOLUTION INTRAMUSCULAR; INTRAVENOUS ONCE
Status: COMPLETED | OUTPATIENT
Start: 2018-07-10 | End: 2018-07-10

## 2018-07-10 RX ORDER — DOXYCYCLINE HYCLATE 100 MG/1
100 CAPSULE ORAL EVERY 12 HOURS SCHEDULED
Qty: 14 CAPSULE | Refills: 0 | Status: SHIPPED | OUTPATIENT
Start: 2018-07-10 | End: 2018-07-17

## 2018-07-10 RX ORDER — ETONOGESTREL/ETHINYL ESTRADIOL .12-.015MG
1 RING, VAGINAL VAGINAL
Qty: 3 EACH | Refills: 3 | Status: SHIPPED | OUTPATIENT
Start: 2018-07-10 | End: 2018-12-10 | Stop reason: SDUPTHER

## 2018-07-10 RX ORDER — ETONOGESTREL/ETHINYL ESTRADIOL .12-.015MG
RING, VAGINAL VAGINAL
COMMUNITY
Start: 2018-06-23 | End: 2018-07-10 | Stop reason: SDUPTHER

## 2018-07-10 RX ADMIN — CEFTRIAXONE SODIUM 250 MG: 250 INJECTION, POWDER, FOR SOLUTION INTRAMUSCULAR; INTRAVENOUS at 08:18

## 2018-07-10 NOTE — PROGRESS NOTES
Assessment/Plan:    Encounter for gynecological examination with abnormal finding    LGSIL on Pap smear of cervix  -     Liquid-based pap, diagnostic   Any abnormality will need colposcopy    Vaginal discharge  -     Chlamydia/GC amplified DNA by PCR; Future  -     GP Trichomonas By Multiplex PCR    Pelvic inflammatory disease  -     cefTRIAXone (ROCEPHIN) injection 250 mg; Inject 250 mg into a muscle once   -     doxycycline hyclate (VIBRAMYCIN) 100 mg capsule; Take 1 capsule (100 mg total) by mouth every 12 (twelve) hours for 7 days    Will have patient return for re-check in 1-2 weeks due to concern for PID with extreme tenderness on exam    May consider pelvic floor PT if no resolution  Had normal US in April  Other orders  -     NUVARING 0 12-0 015 MG/24HR vaginal ring;         Subjective:      Patient ID: Christophe Avilez is a 29 y o  female  Titivincent Ruddy is a  here for annual exam   Last pap 2017 - LSIL can't rule out HSIL, biopsy in pregnancy consistent with MIRIAM-1  LMP - can't remember date, went back on Nuvaring -had too many issues with the extended cycle OCP    Continues to have daily lower abdominal pain, and pain with intercourse, but has been just living with it  Denies urinary issues, leaking of urine  No changes in her bowel habits  No breast concerns  Working, daughter is doing well  No new sexual partners or other concerns  The following portions of the patient's history were reviewed and updated as appropriate: allergies, current medications, past family history, past medical history, past social history, past surgical history and problem list     Review of Systems   Constitutional: Negative for activity change and unexpected weight change  Gastrointestinal: Negative for abdominal distention, abdominal pain, constipation and diarrhea  Genitourinary: Positive for dyspareunia and pelvic pain   Negative for dysuria, menstrual problem, vaginal bleeding, vaginal discharge and vaginal pain  Objective:    Ht 5' 1" (1 549 m)   Wt 54 9 kg (121 lb)   LMP  (Within Weeks)   Breastfeeding? No   BMI 22 86 kg/m²        Physical Exam   Constitutional: She is oriented to person, place, and time  She appears well-developed and well-nourished  No distress  Pulmonary/Chest: No respiratory distress  Right breast exhibits no inverted nipple, no mass, no nipple discharge, no skin change and no tenderness  Left breast exhibits no inverted nipple, no mass, no nipple discharge, no skin change and no tenderness  Abdominal: Soft  There is tenderness  Genitourinary: Vagina normal  There is no rash or lesion on the right labia  There is no rash or lesion on the left labia  Uterus is tender  Uterus is not enlarged  Cervix exhibits motion tenderness and discharge  Right adnexum displays no tenderness  Left adnexum displays tenderness  No bleeding in the vagina  Neurological: She is alert and oriented to person, place, and time  Skin: Skin is warm  Psychiatric: She has a normal mood and affect  Vitals reviewed

## 2018-07-10 NOTE — PATIENT INSTRUCTIONS
Pelvic Inflammatory Disease   WHAT YOU NEED TO KNOW:   Pelvic inflammatory disease (PID) is a condition that causes your reproductive organs to become inflamed  Your reproductive organs include your ovaries, fallopian tubes, uterus, cervix (lower area of your uterus), and vagina  PID may cause chronic (long-term) abdominal pain and problems with future pregnancies  You may have no symptoms of PID  DISCHARGE INSTRUCTIONS:   Return to the emergency department if:   · You have chills or a high fever  · You have pain in your upper right abdomen  · You have pain in your lower abdomen that does not go away with rest or medicine  · Your symptoms get worse or do not improve after 3 days of treatment  Contact your healthcare provider if:   · You have nausea or are vomiting  · Your skin is red, itchy, or you have a new rash  · You think or know you are pregnant  · You have questions or concerns about your condition or care  Medicines:   · Medicines  may be given to prevent or fight a bacterial infection or to reduce pain  Ask your healthcare provider how to take pain medicine safely  · Take your medicine as directed  Contact your healthcare provider if you think your medicine is not helping or if you have side effects  Tell him of her if you are allergic to any medicine  Keep a list of the medicines, vitamins, and herbs you take  Include the amounts, and when and why you take them  Bring the list or the pill bottles to follow-up visits  Carry your medicine list with you in case of an emergency  Manage PID:   · Finish your treatment  If you do not finish your treatment for PID, your infection may not go away  You may also have an increased risk for another STI in the future  · Do not have sex until your healthcare provider says it is okay  You will need to finish treatment before it is safe to have sex  · Do not have unprotected sex  Always use a latex condom   Do not have sex while you or your partners are being treated for an STI  · Talk to your sex partners  If you have an STI, tell your recent partners  Tell them to see a healthcare provider for testing and treatment  This will help stop the spread of infection to others or back to you  Decrease your risk for PID:   · Do not have unprotected sex  Always use a latex condom  Do not have sex while you or your partners are being treated for an STI  · Get tested for STIs  before and after new sex partners  Talk to your partner and ask them to get tested  · Do not delay treatment for STIs or vaginal infections  Talk to your healthcare provider if you think you have an STI  Early treatment can prevent health problems that may lead to PID  Follow up with your healthcare provider as directed: You may need a follow up visit a few days after you start your treatment  Your healthcare provider may ask you if your recent sexual partners have also been treated for an STI  You may need more tests if your symptoms do not go away or worsen after treatment  Your treatment may need to be changed if your symptoms are not getting better  Write down your questions so you remember to ask them during your visits  © 2017 [facility] Information is for End User's use only and may not be sold, redistributed or otherwise used for commercial purposes  All illustrations and images included in CareNotes® are the copyrighted property of A D A M , Inc  or Luis Adhikari  The above information is an  only  It is not intended as medical advice for individual conditions or treatments  Talk to your doctor, nurse or pharmacist before following any medical regimen to see if it is safe and effective for you

## 2018-07-11 ENCOUNTER — TELEPHONE (OUTPATIENT)
Dept: OBGYN CLINIC | Facility: CLINIC | Age: 28
End: 2018-07-11

## 2018-07-11 LAB
CHLAMYDIA DNA CVX QL NAA+PROBE: NORMAL
N GONORRHOEA DNA GENITAL QL NAA+PROBE: NORMAL

## 2018-07-11 NOTE — TELEPHONE ENCOUNTER
Pharm called said the doxycycline hyclate is not covered under pt's pan but the doxycycline monohydrate is please advise

## 2018-07-13 ENCOUNTER — TELEPHONE (OUTPATIENT)
Dept: OBGYN CLINIC | Facility: CLINIC | Age: 28
End: 2018-07-13

## 2018-07-13 LAB — HPV RRNA GENITAL QL NAA+PROBE: ABNORMAL

## 2018-07-16 ENCOUNTER — TELEPHONE (OUTPATIENT)
Dept: OBGYN CLINIC | Facility: CLINIC | Age: 28
End: 2018-07-16

## 2018-07-16 RX ORDER — DOXYCYCLINE 100 MG/1
CAPSULE ORAL
COMMUNITY
Start: 2018-07-11 | End: 2018-07-24 | Stop reason: ALTCHOICE

## 2018-07-17 ENCOUNTER — OFFICE VISIT (OUTPATIENT)
Dept: INTERNAL MEDICINE CLINIC | Facility: CLINIC | Age: 28
End: 2018-07-17
Payer: COMMERCIAL

## 2018-07-17 VITALS
DIASTOLIC BLOOD PRESSURE: 58 MMHG | WEIGHT: 122.58 LBS | BODY MASS INDEX: 21.72 KG/M2 | SYSTOLIC BLOOD PRESSURE: 108 MMHG | HEIGHT: 63 IN | TEMPERATURE: 98.2 F | HEART RATE: 88 BPM

## 2018-07-17 DIAGNOSIS — M06.9 RHEUMATOID ARTHRITIS (HCC): ICD-10-CM

## 2018-07-17 DIAGNOSIS — N80.9 ENDOMETRIOSIS: ICD-10-CM

## 2018-07-17 DIAGNOSIS — L30.9 ECZEMA: ICD-10-CM

## 2018-07-17 DIAGNOSIS — IMO0002 CHRONIC MIGRAINE: Primary | ICD-10-CM

## 2018-07-17 LAB
LAB AP GYN PRIMARY INTERPRETATION: NORMAL
Lab: NORMAL
PATH INTERP SPEC-IMP: NORMAL

## 2018-07-17 PROCEDURE — 99213 OFFICE O/P EST LOW 20 MIN: CPT | Performed by: INTERNAL MEDICINE

## 2018-07-17 PROCEDURE — 3008F BODY MASS INDEX DOCD: CPT | Performed by: INTERNAL MEDICINE

## 2018-07-17 RX ORDER — LEVONORGESTREL / ETHINYL ESTRADIOL AND ETHINYL ESTRADIOL 150-30(84)
KIT ORAL
COMMUNITY
Start: 2018-07-15 | End: 2018-07-24 | Stop reason: ALTCHOICE

## 2018-07-17 RX ORDER — SUMATRIPTAN 50 MG/1
50 TABLET, FILM COATED ORAL ONCE AS NEEDED
Qty: 9 TABLET | Refills: 0 | Status: SHIPPED | OUTPATIENT
Start: 2018-07-17

## 2018-07-17 RX ORDER — CLOBETASOL PROPIONATE 0.5 MG/G
CREAM TOPICAL 2 TIMES DAILY
Qty: 30 G | Refills: 3 | Status: SHIPPED | OUTPATIENT
Start: 2018-07-17 | End: 2019-07-17

## 2018-07-17 RX ORDER — PROPRANOLOL HYDROCHLORIDE 20 MG/1
20 TABLET ORAL EVERY 12 HOURS SCHEDULED
Qty: 30 TABLET | Refills: 3 | Status: SHIPPED | OUTPATIENT
Start: 2018-07-17

## 2018-07-17 RX ORDER — MELOXICAM 15 MG/1
15 TABLET ORAL DAILY PRN
Qty: 14 TABLET | Refills: 0 | Status: SHIPPED | OUTPATIENT
Start: 2018-07-17 | End: 2018-07-24 | Stop reason: ALTCHOICE

## 2018-07-17 RX ORDER — PROPRANOLOL HYDROCHLORIDE 40 MG/1
40 TABLET ORAL EVERY 12 HOURS
Qty: 60 TABLET | Refills: 3 | Status: SHIPPED | OUTPATIENT
Start: 2018-07-17 | End: 2018-07-24 | Stop reason: SDUPTHER

## 2018-07-24 ENCOUNTER — OFFICE VISIT (OUTPATIENT)
Dept: OBGYN CLINIC | Facility: CLINIC | Age: 28
End: 2018-07-24

## 2018-07-24 VITALS — SYSTOLIC BLOOD PRESSURE: 108 MMHG | DIASTOLIC BLOOD PRESSURE: 52 MMHG | BODY MASS INDEX: 21.79 KG/M2 | WEIGHT: 123 LBS

## 2018-07-24 DIAGNOSIS — N92.6 IRREGULAR BLEEDING: ICD-10-CM

## 2018-07-24 DIAGNOSIS — N80.9 ENDOMETRIOSIS: Primary | ICD-10-CM

## 2018-07-24 LAB — SL AMB POCT URINE HCG: NEGATIVE

## 2018-07-24 PROCEDURE — 99213 OFFICE O/P EST LOW 20 MIN: CPT | Performed by: OBSTETRICS & GYNECOLOGY

## 2018-07-24 PROCEDURE — 81025 URINE PREGNANCY TEST: CPT | Performed by: OBSTETRICS & GYNECOLOGY

## 2018-07-24 RX ORDER — TRAMADOL HYDROCHLORIDE 50 MG/1
50 TABLET ORAL EVERY 6 HOURS PRN
Qty: 20 TABLET | Refills: 0 | Status: SHIPPED | OUTPATIENT
Start: 2018-07-24

## 2018-07-24 RX ORDER — PROPRANOLOL HYDROCHLORIDE 40 MG/1
40 TABLET ORAL 3 TIMES DAILY
COMMUNITY

## 2018-07-24 NOTE — PROGRESS NOTES
[facility]     SUBJECTIVE    HPI: Mary Lou Astudillo is a 29 y o   female presenting today for a follow-up visit  Approximately 2 weeks ago she was seen for ongoing pelvic pain, she had intense cervical motion tenderness so at that time was treated for pelvic inflammatory disease with Rocephin and doxycycline  She has completed her course of antibiotics  She has not noted any significant changes in her pain  She continues to have pain with sex  She has some vaginal discomfort, and recently had diarrhea for last 2-3 days  She is being worked up and diagnosed with rheumatic arthritis, and may be starting treatment with her rheumatologist     She has laparoscopy proven endometriosis  She had a trial of continuous OCPs but had irregular bleeding with this  She is doing well with the NuvaRing  She is not having any ongoing vaginal discharge  The following portions of the patient's history were reviewed and updated as appropriate: allergies, current medications, past family history, past medical history, past social history, past surgical history and problem list         ROS  General: negative for chills or fever   Respiratory: no cough, shortness of breath, or wheezing  Cardiovascular: no chest pain or dyspnea on exertion  Gastrointestinal: positive for - diarrhea  Urinary: dysuria  Genitourinary: Negative  Neurological: no TIA or stroke symptoms      OBJECTIVE  Vitals:    18 1034   BP: 108/52   BP Location: Left arm   Patient Position: Sitting   Cuff Size: Large   Weight: 55 8 kg (123 lb)     Physical Exam   Constitutional: She appears well-developed and well-nourished  No distress  Genitourinary: Vagina normal  There is no rash or lesion on the right labia  There is no rash or lesion on the left labia  No vaginal discharge found  Right adnexum does not display tenderness and does not display fullness  Left adnexum displays tenderness   Cervix does not exhibit motion tenderness  Uterus is tender  Uterus is not enlarged  Abdominal: Soft  She exhibits no distension  Vitals reviewed  Lab Results:   Office Visit on 07/24/2018   Component Date Value     URINE HCG 07/24/2018 negative         ASSESSMENT    40-year-old with endometriosis, pelvic pain    PLAN  1  Discussed using NuvaRing continuously  She will try this over the next several months  2  Rx for tramadol provided in case of severe pain  3  If symptoms do not improve would recommend repeat laparoscopy and fulguration of endometriosis  4  Abnormal Pap smear:  Colposcopy is scheduled    All questions were answered & Luis Broad expressed understanding      Jarad Dangelo MD

## 2018-08-07 ENCOUNTER — PROCEDURE VISIT (OUTPATIENT)
Dept: OBGYN CLINIC | Facility: CLINIC | Age: 28
End: 2018-08-07
Payer: COMMERCIAL

## 2018-08-07 VITALS
DIASTOLIC BLOOD PRESSURE: 72 MMHG | WEIGHT: 123 LBS | BODY MASS INDEX: 23.22 KG/M2 | SYSTOLIC BLOOD PRESSURE: 108 MMHG | HEIGHT: 61 IN

## 2018-08-07 DIAGNOSIS — R87.810 ATYPICAL SQUAMOUS CELL CHANGES OF UNDETERMINED SIGNIFICANCE (ASCUS) ON CERVICAL CYTOLOGY WITH POSITIVE HIGH RISK HUMAN PAPILLOMA VIRUS (HPV): Primary | ICD-10-CM

## 2018-08-07 DIAGNOSIS — R87.610 ATYPICAL SQUAMOUS CELL CHANGES OF UNDETERMINED SIGNIFICANCE (ASCUS) ON CERVICAL CYTOLOGY WITH POSITIVE HIGH RISK HUMAN PAPILLOMA VIRUS (HPV): Primary | ICD-10-CM

## 2018-08-07 PROCEDURE — 88305 TISSUE EXAM BY PATHOLOGIST: CPT | Performed by: PATHOLOGY

## 2018-08-07 PROCEDURE — 57455 BIOPSY OF CERVIX W/SCOPE: CPT | Performed by: OBSTETRICS & GYNECOLOGY

## 2018-08-07 NOTE — PROGRESS NOTES
Colposcopy  Date/Time: 8/7/2018 8:27 AM  Performed by: Anabel Campbell  Authorized by: Anabel Campbell     Consent:     Consent obtained:  Verbal    Consent given by:  Patient    Patient questions answered: yes      Patient agrees, verbalizes understanding, and wants to proceed: yes      Educational handouts given: yes    Pre-procedure:     Prepped with: acetic acid    Indication:     Indication:  ASC-US  Procedure:     Procedure: Colposcopy w/ biopsy of cervix      Copen speculum was placed in the vagina: yes      Under colposcopic examination the transition zone was seen in entirety: yes      Cervical biopsy performed with a cervical biopsy punch: yes      Tampon inserted: no      Monsel's solution was applied: yes      Biopsy(s): yes      Location:  6    Specimen to pathology: yes    Post-procedure:     Findings: White epithelium      Impression: Low grade cervical dysplasia      Patient tolerance of procedure: Tolerated well, no immediate complications  Comments:      /72 (BP Location: Right arm, Patient Position: Sitting)   Ht 5' 1" (1 549 m)   Wt 55 8 kg (123 lb)   Breastfeeding?  No   BMI 23 24 kg/m²

## 2018-08-07 NOTE — PATIENT INSTRUCTIONS
Colposcopy   WHAT YOU NEED TO KNOW:   You may have light bleeding or spotting after the procedure  If a biopsy was taken, you may have cramping and bleeding for several days  If medicine was used to control bleeding, you may have brown or black discharge  DISCHARGE INSTRUCTIONS:   Seek care immediately if:   · You have severe pain in your lower abdomen  · You soak through 1 sanitary pad in 1 hour or less  · You feel weak, dizzy, or faint  Contact your healthcare provider if:   · You have a fever, chills, or foul-smelling discharge  · You have bleeding with clots  · Your pain gets worse or does not get better after you take pain medicine  · You have questions or concerns about your condition or care  Medicines:   · NSAIDs , such as ibuprofen, help decrease swelling, pain, and fever  NSAIDs can cause stomach bleeding or kidney problems in certain people  If you take blood thinner medicine, always ask your healthcare provider if NSAIDs are safe for you  Always read the medicine label and follow directions  · Take your medicine as directed  Contact your healthcare provider if you think your medicine is not helping or if you have side effects  Tell him or her if you are allergic to any medicine  Keep a list of the medicines, vitamins, and herbs you take  Include the amounts, and when and why you take them  Bring the list or the pill bottles to follow-up visits  Carry your medicine list with you in case of an emergency  Activity:  If no biopsy was taken, you can resume your usual activities after the procedure  If a biopsy was taken, rest as directed  Do not exercise, play sports, or lift anything heavier than 5 pounds  Ask your healthcare provider when you can return to your usual activities  Do not put anything in your vagina for 2 weeks: If a biopsy was taken, do not douche, use medicines in your vagina, or have sex  Do not use tampons  Instead, wear a sanitary pad for bleeding     Follow up with your healthcare provider as directed:  Write down your questions so you remember to ask them during your visits  © 2017 [employer] Information is for End User's use only and may not be sold, redistributed or otherwise used for commercial purposes  All illustrations and images included in CareNotes® are the copyrighted property of A D [employer] M [employer] Inc  or Luis Adhikari  The above information is an  only  It is not intended as medical advice for individual conditions or treatments  Talk to your doctor, nurse or pharmacist before following any medical regimen to see if it is safe and effective for you

## 2018-08-10 ENCOUNTER — TELEPHONE (OUTPATIENT)
Dept: OBGYN CLINIC | Facility: CLINIC | Age: 28
End: 2018-08-10

## 2018-08-10 DIAGNOSIS — B96.89 BV (BACTERIAL VAGINOSIS): Primary | ICD-10-CM

## 2018-08-10 DIAGNOSIS — N76.0 BV (BACTERIAL VAGINOSIS): Primary | ICD-10-CM

## 2018-08-10 RX ORDER — METRONIDAZOLE 500 MG/1
500 TABLET ORAL EVERY 12 HOURS SCHEDULED
Qty: 14 TABLET | Refills: 0 | Status: SHIPPED | OUTPATIENT
Start: 2018-08-10 | End: 2018-08-17

## 2018-08-10 NOTE — TELEPHONE ENCOUNTER
Patient had Colposcopy on Tuesday  Reports strong odor, feels dizzy and sweaty, Vomiting this morning at work  States no chance of pregnancy  Removed Nuva Ring last night  Report pelvic pain 7/10  Has not tried anything for pain  Can not take temp at this time  States pain is different from Endometriosis pain  Advised to try to eat something light and take Tylenol  Will route to Dr Rockney Cushing to advise

## 2018-08-10 NOTE — TELEPHONE ENCOUNTER
----- Message from Duarte Torres sent at 8/10/2018  9:15 AM EDT -----  Regarding: RE:Your Recent Visit  Contact: [phone]  [facility] Dr Ella Vaughn,    Since my visit I noticed I was not having as much discharge as I did the first time I had my colposcopy done  Last night I noticed a weird smell so I decided to remove my nuvaring and a bunch of clots were all over the ring  This morning I felt extremely dizzy and sweaty and I started vomiting and I have really bad pelvic pain when I sit  Im not sure what I should do    ----- Message -----  From: Nikki Dejesus MD  Sent: 8/7/2018  8:49 AM EDT  To: Duarte Torres  Subject: Your Recent Visit  Duarte Torres, you have a [facility]  Please click on visits and then your most recent appointment, to view your After Visit Summary  If you are experiencing any technical issues please call our patient service desk at [phone]) option [phone]

## 2018-08-20 DIAGNOSIS — R10.2 PELVIC PAIN: Primary | ICD-10-CM

## 2018-08-20 NOTE — TELEPHONE ENCOUNTER
Pt taking tramodol from dr Infante Peers, she was to call if this isnt working, still in pain please advise

## 2018-08-20 NOTE — TELEPHONE ENCOUNTER
Spoke with pt - saw Dr Mike Rachel 07/27, was given Rx of Tramadol to take as needed for severe pain from endometriosis  Has 3 left  Patient states it has not helped - the pain is getting worse  Was told if it does become worse to call back  Patient is aware Dr Mike Rachel is out of office till next week  Patient wants to know if she should continue taking Tramadol - if she does, she will need a refill of the Tramadol - or make an appointment to discuss other options  Please advise  Thanks!

## 2018-08-21 RX ORDER — TRAMADOL HYDROCHLORIDE 50 MG/1
TABLET ORAL
Qty: 20 TABLET | Refills: 0 | Status: SHIPPED | OUTPATIENT
Start: 2018-08-21 | End: 2018-08-21

## 2018-08-21 NOTE — TELEPHONE ENCOUNTER
OK to refill tramadol  It sounds like Dr Sola Ceja just changed her nuvaring to continuous which may take a few months to work

## 2018-11-08 ENCOUNTER — TELEPHONE (OUTPATIENT)
Dept: INTERNAL MEDICINE CLINIC | Facility: CLINIC | Age: 28
End: 2018-11-08

## 2018-11-08 DIAGNOSIS — IMO0002 CHRONIC MIGRAINE: Primary | ICD-10-CM

## 2018-11-08 NOTE — TELEPHONE ENCOUNTER
[facility] CALLED IN REQUESTING THAT PROVIDER PLACE AN AMBULATORY ORDER TO [facility]

## 2018-12-10 DIAGNOSIS — N80.9 ENDOMETRIOSIS: ICD-10-CM

## 2018-12-13 RX ORDER — ETONOGESTREL/ETHINYL ESTRADIOL .12-.015MG
RING, VAGINAL VAGINAL
Qty: 1 EACH | Refills: 10 | Status: SHIPPED | OUTPATIENT
Start: 2018-12-13

## 2019-05-02 NOTE — PROGRESS NOTES
OVERDUE RESULTS  Called patient to make her aware of overdue labs that need to be completed  Patient stated she no longer is associated with [facility] so she will not be going to get the lab work that was ordered by them  RF screen w/ Reflex titer, CBC with diff, CMP, EDELMIRA comprhensive panel, and sedimentation rate

## 2019-06-18 ENCOUNTER — TELEPHONE (OUTPATIENT)
Dept: NEUROLOGY | Facility: CLINIC | Age: 29
End: 2019-06-18

## 2019-10-18 ENCOUNTER — TRANSCRIBE ORDERS (OUTPATIENT)
Dept: NEUROLOGY | Facility: CLINIC | Age: 29
End: 2019-10-18

## 2019-10-18 DIAGNOSIS — G43.709 CHRONIC MIGRAINE WITHOUT AURA, NOT INTRACTABLE, WITHOUT STATUS MIGRAINOSUS: Primary | ICD-10-CM

## 2021-02-04 NOTE — OB LABOR/OXYTOCIN SAFETY PROGRESS
Oxytocin Safety Progress Check Note - Jason Perez 32 y o  female MRN: [id number]    Unit/Bed#: -01 Encounter: [id number]    Obstetric History       T0      L0     SAB0   TAB0   Ectopic0   Multiple0   Live Births0      Gestational Age: 39w2d  Dose (fabio-units/min) Oxytocin: 6 fabio-units/min  Contraction Frequency (minutes): 4-6  Contraction Quality: Moderate  Tachysystole: No   Dilation: 9        Effacement (%): 100  Station: 1  Baseline Rate: 160 bpm (Difficult to interpret secondary to episodes about 5 minutes long of rate in 180's)  Fetal Heart Rate: 140 BPM  FHR Category: Category I     Oxytocin Safety Progress Check: Safety check completed    Notes/comments:   Patient is feeling slightly more vaginal pressure but is not uncomfortable  Patient is unchanged from last exam  However, pitocin was not increased secondary to the episodes of increased heart rate  Therefore, we will start to increase the pitocin per protocol until contractions ate q2-4 minutes  If no change at next examination to consider and IUPC for appropriate titration of contractions      Ema Montalvo MD 10/25/2017 9:37 AM As per patient's son, patient admitted from rehab facility where she was d/c after prior admission at [facility] ; at prior baseline, patient lived with son who is her caregiver in an elevator apt building. Patient had HHA 5 days a week for 6 hours and 4 hours on Sunday. Prior to first admission to [facility], patient was independent with ADL's at home.

## 2021-03-31 DIAGNOSIS — Z23 ENCOUNTER FOR IMMUNIZATION: ICD-10-CM

## 2022-03-09 ENCOUNTER — APPOINTMENT (OUTPATIENT)
Dept: LAB | Age: 32
End: 2022-03-09
Payer: COMMERCIAL

## 2022-03-09 DIAGNOSIS — N95.1 SYMPTOMATIC MENOPAUSAL OR FEMALE CLIMACTERIC STATES: ICD-10-CM

## 2022-03-09 DIAGNOSIS — R53.83 FATIGUE, UNSPECIFIED TYPE: ICD-10-CM

## 2022-03-09 LAB
ALBUMIN SERPL BCP-MCNC: 4.4 G/DL (ref 3.5–5)
ALP SERPL-CCNC: 56 U/L (ref 46–116)
ALT SERPL W P-5'-P-CCNC: 23 U/L (ref 12–78)
ANION GAP SERPL CALCULATED.3IONS-SCNC: 5 MMOL/L (ref 4–13)
AST SERPL W P-5'-P-CCNC: 16 U/L (ref 5–45)
BILIRUB SERPL-MCNC: 0.42 MG/DL (ref 0.2–1)
BUN SERPL-MCNC: 11 MG/DL (ref 5–25)
CALCIUM SERPL-MCNC: 10.3 MG/DL (ref 8.3–10.1)
CHLORIDE SERPL-SCNC: 106 MMOL/L (ref 100–108)
CO2 SERPL-SCNC: 27 MMOL/L (ref 21–32)
CREAT SERPL-MCNC: 0.68 MG/DL (ref 0.6–1.3)
ERYTHROCYTE [DISTWIDTH] IN BLOOD BY AUTOMATED COUNT: 12.3 % (ref 11.6–15.1)
ESTRADIOL SERPL-MCNC: 53 PG/ML
FSH SERPL-ACNC: 6 MIU/ML
GFR SERPL CREATININE-BSD FRML MDRD: 116 ML/MIN/1.73SQ M
GLUCOSE P FAST SERPL-MCNC: 82 MG/DL (ref 65–99)
HCT VFR BLD AUTO: 40.5 % (ref 34.8–46.1)
HGB BLD-MCNC: 12.9 G/DL (ref 11.5–15.4)
MCH RBC QN AUTO: 29.3 PG (ref 26.8–34.3)
MCHC RBC AUTO-ENTMCNC: 31.9 G/DL (ref 31.4–37.4)
MCV RBC AUTO: 92 FL (ref 82–98)
PLATELET # BLD AUTO: 268 THOUSANDS/UL (ref 149–390)
PMV BLD AUTO: 11.8 FL (ref 8.9–12.7)
POTASSIUM SERPL-SCNC: 3.8 MMOL/L (ref 3.5–5.3)
PROT SERPL-MCNC: 8.8 G/DL (ref 6.4–8.2)
RBC # BLD AUTO: 4.41 MILLION/UL (ref 3.81–5.12)
SODIUM SERPL-SCNC: 138 MMOL/L (ref 136–145)
T3FREE SERPL-MCNC: 2.49 PG/ML (ref 2.3–4.2)
TESTOST SERPL-MCNC: 29 NG/DL
TSH SERPL DL<=0.05 MIU/L-ACNC: 0.82 UIU/ML (ref 0.36–3.74)
WBC # BLD AUTO: 5.52 THOUSAND/UL (ref 4.31–10.16)

## 2022-03-09 PROCEDURE — 82670 ASSAY OF TOTAL ESTRADIOL: CPT

## 2022-03-09 PROCEDURE — 85027 COMPLETE CBC AUTOMATED: CPT

## 2022-03-09 PROCEDURE — 84270 ASSAY OF SEX HORMONE GLOBUL: CPT

## 2022-03-09 PROCEDURE — 36415 COLL VENOUS BLD VENIPUNCTURE: CPT

## 2022-03-09 PROCEDURE — 86376 MICROSOMAL ANTIBODY EACH: CPT

## 2022-03-09 PROCEDURE — 83001 ASSAY OF GONADOTROPIN (FSH): CPT

## 2022-03-09 PROCEDURE — 84481 FREE ASSAY (FT-3): CPT

## 2022-03-09 PROCEDURE — 80053 COMPREHEN METABOLIC PANEL: CPT

## 2022-03-09 PROCEDURE — 84403 ASSAY OF TOTAL TESTOSTERONE: CPT

## 2022-03-09 PROCEDURE — 84443 ASSAY THYROID STIM HORMONE: CPT

## 2022-03-10 LAB
SHBG SERPL-SCNC: 102 NMOL/L (ref 24.6–122)
THYROPEROXIDASE AB SERPL-ACNC: <8 IU/ML (ref 0–34)

## 2023-07-18 PROBLEM — Z20.2 EXPOSURE TO STD: Status: ACTIVE | Noted: 2023-01-19

## 2023-07-18 PROBLEM — N87.0 MILD CERVICAL DYSPLASIA: Status: ACTIVE | Noted: 2017-08-24

## 2023-07-18 PROBLEM — A54.9 GONORRHEA IN FEMALE: Status: ACTIVE | Noted: 2023-01-19

## 2023-07-18 PROBLEM — J30.9 ALLERGIC RHINITIS: Status: ACTIVE | Noted: 2023-07-18

## 2023-07-18 PROBLEM — H10.13 ALLERGIC CONJUNCTIVITIS OF BOTH EYES: Status: ACTIVE | Noted: 2023-07-18

## 2023-07-18 PROBLEM — L50.9 HIVES: Status: ACTIVE | Noted: 2023-07-18

## 2023-07-18 PROBLEM — E78.1 HYPERTRIGLYCERIDEMIA: Status: ACTIVE | Noted: 2019-05-16

## 2023-07-18 PROBLEM — L30.9 ECZEMA: Status: ACTIVE | Noted: 2023-07-18

## 2023-07-18 PROBLEM — J45.909 ASTHMA: Status: ACTIVE | Noted: 2017-04-25

## 2023-09-16 PROBLEM — H10.13 ALLERGIC CONJUNCTIVITIS OF BOTH EYES: Status: RESOLVED | Noted: 2023-07-18 | Resolved: 2023-09-16

## 2024-04-09 NOTE — PROGRESS NOTES
Education Record    Learner:  Patient    Disease / Diagnosis: Trelstar inj every 3 months for prostate CA    Barriers / Limitations:  None   Comments:    Method:  Brief focused   Comments:    General Topics:  Plan of care reviewed   Comments:    Outcome:  Shows understanding   Comments:    Tolerated injection well without issue. Will return in July for next inj along with starting zometa infusion (undergoing dental work currently).    INTERNAL MEDICINE FOLLOW-UP OFFICE VISIT  [facility]  [address] [place] [address]    NAME: Avery Reilly  AGE: 29 y o  SEX: female    DATE OF ENCOUNTER: 7/17/2018    Assessment and Plan       Rheumatoid Arthritis  Patient's likely has rheumatoid arthritis given the most recent x-ray findings that show periarticular osteopenia  She also has swelling/redness/warmth/arthritic pain in the MCPs, PIP joints  · CCP Antibodies  · CBC, CMP (check liver function), Urine Pregnancy test will be checked given side effect profile of methotrexate   · Rheumatology referral  · Patient likely needs to start methotrexate, but will await labs  If patient is unable to get Rheumatology appointment in timely manner, then will start methotrexate at clinic      Chronic Migraines  Patient reports unilateral, right-sided pulsating headaches associated with nausea, vomiting, photophobia, phonophobia that occurs 3 times per week  Previously patient was on Fioricet b i d  with headaches worsening so I suspect a component of medication overuse headache  She has previously tried NSAIDs can acetaminophen, caffeine, Topamax, riboflavin  · Propranolol 40mg BID for HA prophylaxis  Can titrate up during next visit in 3 months if needed  · Sumatriptan PRN daily  · Neurology headache appointment in September    Endometriosis s/p Laparoscopic Surgery  · Mobic PRN given for abdominal pain  · OBGYN appointment next week      Eczema  · Refilled Clobetasol  Diagnoses and all orders for this visit:    Chronic migraine  -     propranolol (INDERAL) 20 mg tablet; Take 1 tablet (20 mg total) by mouth every 12 (twelve) hours  -     SUMAtriptan (IMITREX) 50 mg tablet; Take 1 tablet (50 mg total) by mouth once as needed for migraine for up to 1 dose  -     propranolol (INDERAL) 40 mg tablet;  Take 1 tablet (40 mg total) by mouth every 12 (twelve) hours    Rheumatoid arthritis (HCC)  -     Cyclic citrul peptide antibody, IgG; Future  -     Pregnancy Test, Urine  -     CBC; Future  -     Comprehensive metabolic panel; Future  -     Ambulatory referral to Rheumatology; Future    Endometriosis  -     meloxicam (MOBIC) 15 mg tablet; Take 1 tablet (15 mg total) by mouth daily as needed (abdominal pain)    Eczema  -     clobetasol (TEMOVATE) 0 05 % cream; Apply topically 2 (two) times a day    Other orders  -     doxycycline monohydrate (MONODOX) 100 mg capsule;         Orders Placed This Encounter   Procedures    Cyclic citrul peptide antibody, IgG    Pregnancy Test, Urine    CBC    Comprehensive metabolic panel    Ambulatory referral to Rheumatology       - Counseling Documentation: patient was counseled regarding: diagnostic results, instructions for management, risk factor reductions and prognosis    Chief Complaint     Chief Complaint   Patient presents with    Follow-up     pt "review X-rays"    Medication Refill     pt "ezcema ceam; migrane"    Abdominal Pain     pt "for couple months"       History of Present Illness     Patient is a 26-year-old female with a past medical history of chronic migraines, major depressive disorder, anxiety, endometriosis, arthritis who presents for refills and follow-up from previous appointment  Patient continues have migraines and requests Fiorcet  She also complains of abdominal pain similar to her endometriosis pain from previous  Patient states she had laparoscopic surgery in 2016  B/L Hand Swelling/Erythema/Pain in hand joints excluding DIP for past year  Associate with fatigue  Denies rash, ulcers, fever, chills, weight loss  The past months he is having similar symptoms in her bilateral knees worse in the right than the left          The following portions of the patient's history were reviewed and updated as appropriate: allergies, current medications, past family history, past medical history, past social history, past surgical history and problem list     Review of Systems     Review of Systems   Constitutional: Negative for activity change, appetite change, chills, diaphoresis, fatigue and fever  Respiratory: Negative for cough, chest tightness and shortness of breath  Cardiovascular: Negative for chest pain, palpitations and leg swelling  Gastrointestinal: Negative for abdominal distention, abdominal pain, constipation, diarrhea, nausea and vomiting  Genitourinary: Negative for dysuria, frequency and urgency  Skin: Negative for color change, pallor and rash  Neurological: Negative for weakness, light-headedness and numbness  All other systems reviewed and are negative  See HPI    Active Problem List     Patient Active Problem List   Diagnosis    Chronic migraine without aura without status migrainosus, not intractable    Mild intermittent asthma without complication    Severe episode of recurrent major depressive disorder, without psychotic features (HCC)    Anxiety    Endometriosis    Morning joint stiffness of hand    Migraine       Objective     /58 (BP Location: Left arm, Patient Position: Sitting, Cuff Size: Adult)   Pulse 88   Temp 98 2 °F (36 8 °C) (Oral)   Ht 5' 3" (1 6 m)   Wt 55 6 kg (122 lb 9 2 oz)   BMI 21 71 kg/m²     Physical Exam   Constitutional: She is oriented to person, place, and time  She appears well-developed and well-nourished  No distress  HENT:   Head: Normocephalic and atraumatic  Cardiovascular: Normal rate and regular rhythm  Exam reveals no gallop and no friction rub  No murmur heard  Pulmonary/Chest: Effort normal and breath sounds normal  No respiratory distress  She has no wheezes  She has no rales  She exhibits no tenderness  Abdominal: Soft  Bowel sounds are normal  She exhibits no distension  There is no tenderness  There is no rebound  Musculoskeletal: Normal range of motion  She exhibits tenderness  She exhibits no edema or deformity     Tenderness to palpation of right, dorsal medial wrist    Neurological: She is alert and oriented to person, place, and time  Skin: Skin is warm and dry  No rash noted  She is not diaphoretic  No erythema  No pallor  Nursing note and vitals reviewed        Pertinent Laboratory/Diagnostic Studies:  CBC:   Lab Results   Component Value Date/Time    WBC 7 6 12/07/2017 12:32 PM    RBC 4 44 12/07/2017 12:32 PM    HGB 11 6 (L) 06/05/2018 11:46 AM    HGB 12 2 12/07/2017 12:32 PM    HCT 35 3 06/05/2018 11:46 AM    HCT 38 6 12/07/2017 12:32 PM    MCV 90 3 06/05/2018 11:46 AM    MCV 86 9 12/07/2017 12:32 PM    MCH 29 7 06/05/2018 11:46 AM    MCH 27 5 12/07/2017 12:32 PM    MCHC 32 9 06/05/2018 11:46 AM    MCHC 31 6 (L) 12/07/2017 12:32 PM    RDW 12 0 06/05/2018 11:46 AM    RDW 13 3 12/07/2017 12:32 PM    MPV 12 9 (H) 12/07/2017 12:32 PM     06/05/2018 11:46 AM     12/07/2017 12:32 PM    NRBC 0 10/24/2017 02:39 PM    NEUTOPHILPCT 74 10/24/2017 02:39 PM    NEUTOPHILPCT 52 11/05/2014 10:52 PM    LYMPHOPCT 21 10/24/2017 02:39 PM    LYMPHOPCT 41 11/05/2014 10:52 PM    MONOPCT 5 10/24/2017 02:39 PM    MONOPCT 5 11/05/2014 10:52 PM    EOSPCT 0 10/24/2017 02:39 PM    EOSPCT 1 11/05/2014 10:52 PM    BASOPCT 0 7 12/07/2017 12:32 PM    NEUTROABS 3496 12/07/2017 12:32 PM    NEUTROABS 46 12/07/2017 12:32 PM    LYMPHSABS 3602 12/07/2017 12:32 PM    LYMPHSABS 47 4 12/07/2017 12:32 PM    MONOSABS 342 12/07/2017 12:32 PM    EOSABS 41 06/05/2018 11:46 AM    EOSABS 106 12/07/2017 12:32 PM    EOSABS 1 4 12/07/2017 12:32 PM     CBC:     Results from last 6 Months  Lab Units 06/05/18  1146   SL AMB LAB HEMOGLOBIN g/dL 11 6*   SL AMB HEMATOCRIT % 35 3   SL AMB MCV fL 90 3   SL AMB MCH pg 29 7   SL AMB MCHC g/dL 32 9   SL AMB RDW % 12 0   SL AMB PLATELET COUNT Thousand/uL 272   SL AMB EOSINOPHILS ABS cells/uL 41       Current Medications     Current Outpatient Prescriptions:     albuterol (PROVENTIL HFA,VENTOLIN HFA) 90 mcg/act inhaler, Inhale 2 puffs every 6 (six) hours as needed for wheezing, Disp: , Rfl:     clobetasol (TEMOVATE) 0 05 % cream, Apply topically 2 (two) times a day, Disp: 30 g, Rfl: 3    NUVARING 0 12-0 015 MG/24HR vaginal ring, Insert 1 each into the vagina every 28 days, Disp: 3 each, Rfl: 3    butalbital-acetaminophen-caffeine (FIORICET,ESGIC) -40 mg per tablet, Take 1 tablet by mouth every 6 (six) hours as needed for headaches or migraine, Disp: 10 tablet, Rfl: 0    doxycycline hyclate (VIBRAMYCIN) 100 mg capsule, Take 1 capsule (100 mg total) by mouth every 12 (twelve) hours for 7 days, Disp: 14 capsule, Rfl: 0    doxycycline monohydrate (MONODOX) 100 mg capsule, , Disp: , Rfl:     Levonorgest-Eth Estrad 91-Day 0 15-0 03 &0 01 MG TABS, , Disp: , Rfl:     meloxicam (MOBIC) 15 mg tablet, Take 1 tablet (15 mg total) by mouth daily as needed (abdominal pain), Disp: 14 tablet, Rfl: 0    naproxen (NAPROSYN) 250 mg tablet, Take 1 tablet (250 mg total) by mouth 2 (two) times a day with meals, Disp: 28 tablet, Rfl: 1    propranolol (INDERAL) 20 mg tablet, Take 1 tablet (20 mg total) by mouth every 12 (twelve) hours, Disp: 30 tablet, Rfl: 3    propranolol (INDERAL) 40 mg tablet, Take 1 tablet (40 mg total) by mouth every 12 (twelve) hours, Disp: 60 tablet, Rfl: 3    SUMAtriptan (IMITREX) 50 mg tablet, Take 1 tablet (50 mg total) by mouth once as needed for migraine for up to 1 dose, Disp: 9 tablet, Rfl: 0    Health Maintenance     Health Maintenance   Topic Date Due    INFLUENZA VACCINE  09/01/2018    PAP SMEAR  04/25/2020    DTaP,Tdap,and Td Vaccines (3 - Td) 10/26/2027    PNEUMOCOCCAL POLYSACCHARIDE VACCINE AGE 2-64 HIGH RISK  Completed     Immunization History   Administered Date(s) Administered    DTaP 08/09/2017    HPV Quadrivalent 02/14/2008, 04/25/2008, 10/13/2008    Influenza Quadrivalent Preservative Free 3 years and older IM 10/26/2017    Influenza TIV (IM) 11/23/2016    Influenza, Quadrivalent (nasal) 11/02/2016    MMR 10/27/2017    Pneumococcal Polysaccharide PPV23 01/19/2016    Tdap 02/18/2008, 08/09/2017, 10/26/2017    Tuberculin Skin Test-PPD Intradermal 01/18/2017, 01/25/2017       KESHA Kelsey    Internal Medicine PGY-1  7/17/2018 3:20 PM

## 2024-09-30 ENCOUNTER — OFFICE VISIT (OUTPATIENT)
Dept: PODIATRY | Facility: CLINIC | Age: 34
End: 2024-09-30
Payer: COMMERCIAL

## 2024-09-30 ENCOUNTER — APPOINTMENT (OUTPATIENT)
Dept: LAB | Facility: HOSPITAL | Age: 34
End: 2024-09-30
Payer: COMMERCIAL

## 2024-09-30 VITALS
HEART RATE: 89 BPM | SYSTOLIC BLOOD PRESSURE: 100 MMHG | BODY MASS INDEX: 21.53 KG/M2 | DIASTOLIC BLOOD PRESSURE: 66 MMHG | HEIGHT: 62 IN | WEIGHT: 117 LBS

## 2024-09-30 DIAGNOSIS — B35.1 ONYCHOMYCOSIS: ICD-10-CM

## 2024-09-30 DIAGNOSIS — L60.1 ONYCHOLYSIS: Primary | ICD-10-CM

## 2024-09-30 DIAGNOSIS — M79.674 TOE PAIN, RIGHT: ICD-10-CM

## 2024-09-30 PROCEDURE — 11730 AVULSION NAIL PLATE SIMPLE 1: CPT | Performed by: PODIATRIST

## 2024-09-30 PROCEDURE — 87102 FUNGUS ISOLATION CULTURE: CPT | Performed by: PODIATRIST

## 2024-09-30 PROCEDURE — 99203 OFFICE O/P NEW LOW 30 MIN: CPT | Performed by: PODIATRIST

## 2024-09-30 NOTE — PROGRESS NOTES
"  Name: Florence Shin      : 1990      MRN: [id number]  Encounter Provider: David Solitario DPM  Encounter Date: 2024   Encounter department: [facility] PODIATRY [facility]    Assessment & Plan     1. Onycholysis  -     Nail removal  2. Toe pain, right  3. Onychomycosis  -     Fungal culture  -     Nail removal      Discussed with patient nail removal versus partial nail avulsion to the lateral border of the right great toe.    Patient wishes to pursue partial nail avulsion to the lateral border of the right great toe.    Nail removal    Date/Time: 2024 8:00 AM    Performed by: David Solitario DPM  Authorized by: David Solitario DPM    Patient location:  Clinic  Indications / Diagnosis:  Ingrown nail  Universal Protocol:  procedure performed by consultantConsent: Verbal consent obtained.  Risks and benefits: risks, benefits and alternatives were discussed  Consent given by: patient  Time out: Immediately prior to procedure a \"time out\" was called to verify the correct patient, procedure, equipment, support staff and site/side marked as required.  Patient understanding: patient states understanding of the procedure being performed    Location:     Foot:  R big toe  Pre-procedure details:     Skin preparation:  Betadine    Preparation: Patient was prepped and draped in the usual sterile fashion    Anesthesia (see MAR for exact dosages):     Anesthesia method:  Nerve block    Block needle gauge:  25 G    Block anesthetic:  Lidocaine 1% w/o epi    Block technique:  Digital Block    Block outcome:  Anesthesia achieved  Nail Removal:     Nail removed:  Partial    Nail bed sutured: no    Post-procedure details:     Dressing:  4x4 sterile gauze, antibiotic ointment, gauze roll and petrolatum-impregnated gauze    Patient tolerance of procedure:  Tolerated well, no immediate complications  Comments:      Discussion with patient was completed today regarding diagnosis and " potential etiologies as well as treatment options for this ingrown nail diagnosis.  Discussed how to avoid recurrence and possible treatment options if recurrence does occur.    Antibiotic ointment applied to border with bandage dressing  Pt instructed to keep dressing intact for 24 hours.  After this time use triple antibiotic ointment to the area and a dry dressing changed daily  Return to clinic in about 3 weeks for reevaluation.  If ingrown nail recurs can consider use of phenol at nail matrix.  If notice any redness, swelling, drainage, or excessive pain or signs of infection to notify the office sooner.  Procedure completed without incident.  Do not soak foot.    Procedure in detail: Once the toe was anesthetized, the area was scrubbed and prepped with sterile technique.  A hemostat was used to lift up the involved border of the great toe.  Care was taken to protect the underlying nail bed.  An English anvil was used to cut back corner to the base of the nail.  A hemostat was then used to remove the nail that was ingrown.  This was then checked that the entire ingrown portion was removed.  This was removed from the operative area.  Hemostasis was achieved and dressings were applied.         Return in about 3 weeks (around 10/21/2024).    Subjective     Location: right great toe lateral border  Type of pain: sharp pain to the area  Duration and Onset: before February  Aggravating factors: shoes  Treatment so far: no    Pain is placing shoe gear on.  Patient stated that she had injury back in February where she dropped something on the toe.  She has a history of psoriasis as well.    Constitutional:  Negative for chills and fever.   Respiratory:  Negative for chest tightness and shortness of breath.    Gastrointestinal:  Negative for nausea and vomiting.     Current Outpatient Medications on File Prior to Visit   Medication Sig   • albuterol (PROVENTIL HFA,VENTOLIN HFA) 90 mcg/act inhaler TAKE 2 PUFFS BY MOUTH  "EVERY 6 HOURS AS NEEDED FOR WHEEZE OR FOR SHORTNESS OF BREATH   • brompheniramine-pseudoephedrine-DM 30-2-10 MG/5ML syrup Take 10 mL by mouth 4 (four) times a day as needed   • butalbital-acetaminophen-caffeine (FIORICET,ESGIC) -40 mg per tablet TAKE 1 TABLET BY MOUTH EVERY 4 HOURS AS NEEDED FOR HEADACHES.   • cetirizine (ZyrTEC) 10 mg tablet Take 1 tablet (10 mg total) by mouth daily   • fluticasone (FLONASE) 50 mcg/act nasal spray 1 spray into each nostril 2 (two) times a day   • ibuprofen (MOTRIN) 800 mg tablet Take 800 mg by mouth every 6 (six) hours as needed   • Ibuprofen-Famotidine (Duexis) 800-26.6 MG TABS    • predniSONE 20 mg tablet Prednisone 20 mg 2 x day x 3 then 20 mg daily x 3. Take with food   • Relugolix-Estradiol-Norethind (Myfembree) 40-1-0.5 MG TABS    • traMADol (ULTRAM) 50 mg tablet Take 1 tablet (50 mg total) by mouth every 6 (six) hours as needed for severe pain   • triamcinolone (KENALOG) 0.1 % ointment Apply topically 2 (two) times a day as needed for irritation or rash       Objective     /66 (BP Location: Left arm, Patient Position: Sitting, Cuff Size: Adult)   Pulse 89   Ht 5' 2\" (1.575 m)   Wt 53.1 kg (117 lb)   BMI 21.40 kg/m²     Pain on palpation noted to the right lateral great toe nail border.  There is discomfort with medial lateral squeeze at the level of the great toe as well as with dorsal plantar pressure, no tenderness on palpation to the medial border.  No tenderness with palpation directly from dorsal to plantar..  Intact pedal pulses.  Neurological sensation is grossly intact distally. No tenderness other areas of foot or ankle. No other open lesions or ulcerations noted currently.     "

## 2024-10-07 LAB — FUNGUS SPEC CULT: NORMAL

## 2024-10-14 LAB — FUNGUS SPEC CULT: NORMAL

## 2024-10-21 LAB — FUNGUS SPEC CULT: NORMAL

## 2024-10-28 LAB — FUNGUS SPEC CULT: NORMAL

## 2024-11-04 LAB — FUNGUS SPEC CULT: NORMAL

## 2025-07-10 NOTE — PROGRESS NOTES
Discussed episodes of fetal tachycardia with Dr Ochoa Dose, no concerns as variability looks great  Per D  KTM order, lower pitocin to 4 mu and continue to monitor  Occupational Therapy    [facility]  Occupational Therapy Not Seen Note    DATE: 7/10/2025    NAME: Bethany Tello  MRN: [id number]   : [facility], PICU, [facility]